# Patient Record
Sex: MALE | Race: WHITE
[De-identification: names, ages, dates, MRNs, and addresses within clinical notes are randomized per-mention and may not be internally consistent; named-entity substitution may affect disease eponyms.]

---

## 2018-06-02 ENCOUNTER — HOSPITAL ENCOUNTER (OUTPATIENT)
Dept: HOSPITAL 97 - ER | Age: 68
Setting detail: OBSERVATION
LOS: 1 days | Discharge: HOME | End: 2018-06-03
Attending: INTERNAL MEDICINE | Admitting: INTERNAL MEDICINE
Payer: COMMERCIAL

## 2018-06-02 VITALS — BODY MASS INDEX: 30.4 KG/M2

## 2018-06-02 DIAGNOSIS — G40.909: ICD-10-CM

## 2018-06-02 DIAGNOSIS — K21.9: ICD-10-CM

## 2018-06-02 DIAGNOSIS — F20.9: ICD-10-CM

## 2018-06-02 DIAGNOSIS — R07.9: Primary | ICD-10-CM

## 2018-06-02 DIAGNOSIS — C18.9: ICD-10-CM

## 2018-06-02 DIAGNOSIS — E78.5: ICD-10-CM

## 2018-06-02 DIAGNOSIS — N31.8: ICD-10-CM

## 2018-06-02 DIAGNOSIS — I10: ICD-10-CM

## 2018-06-02 DIAGNOSIS — K57.90: ICD-10-CM

## 2018-06-02 DIAGNOSIS — M15.9: ICD-10-CM

## 2018-06-02 DIAGNOSIS — J30.9: ICD-10-CM

## 2018-06-02 LAB
ALBUMIN SERPL BCP-MCNC: 3.9 G/DL (ref 3.2–5.5)
ALP SERPL-CCNC: 84 IU/L (ref 42–121)
ALT SERPL W P-5'-P-CCNC: 21 IU/L (ref 10–60)
AST SERPL W P-5'-P-CCNC: 24 IU/L (ref 10–42)
BUN BLD-MCNC: 16 MG/DL (ref 6–20)
CKMB CREATINE KINASE MB: 1.8 NG/ML (ref 0.3–4)
GLUCOSE SERPLBLD-MCNC: 99 MG/DL (ref 65–120)
HCT VFR BLD CALC: 40.3 % (ref 39.6–49)
INR BLD: 1.03
LYMPHOCYTES # SPEC AUTO: 1.3 K/UL (ref 0.7–4.9)
MAGNESIUM SERPL-MCNC: 1.8 MG/DL (ref 1.8–2.5)
MCH RBC QN AUTO: 32.4 PG (ref 27–35)
MCV RBC: 94.5 FL (ref 80–100)
PMV BLD: 7.8 FL (ref 7.6–11.3)
POTASSIUM SERPL-SCNC: 4 MEQ/L (ref 3.6–5)
RBC # BLD: 4.27 M/UL (ref 4.33–5.43)

## 2018-06-02 PROCEDURE — 82550 ASSAY OF CK (CPK): CPT

## 2018-06-02 PROCEDURE — 82553 CREATINE MB FRACTION: CPT

## 2018-06-02 PROCEDURE — 96375 TX/PRO/DX INJ NEW DRUG ADDON: CPT

## 2018-06-02 PROCEDURE — 80184 ASSAY OF PHENOBARBITAL: CPT

## 2018-06-02 PROCEDURE — 96374 THER/PROPH/DIAG INJ IV PUSH: CPT

## 2018-06-02 PROCEDURE — 85025 COMPLETE CBC W/AUTO DIFF WBC: CPT

## 2018-06-02 PROCEDURE — 84484 ASSAY OF TROPONIN QUANT: CPT

## 2018-06-02 PROCEDURE — 83735 ASSAY OF MAGNESIUM: CPT

## 2018-06-02 PROCEDURE — 85610 PROTHROMBIN TIME: CPT

## 2018-06-02 PROCEDURE — 85730 THROMBOPLASTIN TIME PARTIAL: CPT

## 2018-06-02 PROCEDURE — 71275 CT ANGIOGRAPHY CHEST: CPT

## 2018-06-02 PROCEDURE — 81003 URINALYSIS AUTO W/O SCOPE: CPT

## 2018-06-02 PROCEDURE — 93005 ELECTROCARDIOGRAM TRACING: CPT

## 2018-06-02 PROCEDURE — 36415 COLL VENOUS BLD VENIPUNCTURE: CPT

## 2018-06-02 PROCEDURE — 80185 ASSAY OF PHENYTOIN TOTAL: CPT

## 2018-06-02 PROCEDURE — 96372 THER/PROPH/DIAG INJ SC/IM: CPT

## 2018-06-02 PROCEDURE — 71045 X-RAY EXAM CHEST 1 VIEW: CPT

## 2018-06-02 PROCEDURE — 83880 ASSAY OF NATRIURETIC PEPTIDE: CPT

## 2018-06-02 PROCEDURE — 80076 HEPATIC FUNCTION PANEL: CPT

## 2018-06-02 PROCEDURE — 80048 BASIC METABOLIC PNL TOTAL CA: CPT

## 2018-06-02 PROCEDURE — 99285 EMERGENCY DEPT VISIT HI MDM: CPT

## 2018-06-02 RX ADMIN — Medication SCH ML: at 22:25

## 2018-06-02 RX ADMIN — MORPHINE SULFATE PRN MG: 4 INJECTION, SOLUTION INTRAMUSCULAR; INTRAVENOUS at 23:47

## 2018-06-02 NOTE — EDPHYS
Physician Documentation                                                                           

 [facility]                                                                

Name: Tai Reyes                                                                                

Age: 67 yrs                                                                                       

Sex: Male                                                                                         

: 1950                                                                                   

MRN: [id number]                                                                                   

Arrival Date: 2018                                                                          

Time: 16:19                                                                                       

Account#: [id number]                                                                            

Bed 6                                                                                             

Private MD:                                                                                       

ED Physician Adam Alston                                                                       

HPI:                                                                                              

                                                                                             

17:00 This 67 yrs old  Male presents to ER via EMS with complaints of Chest Pain >   pm1 

      29 y/o.                                                                                     

17:00 The patient or guardian reports chest pain that is located primarily in the anterior    pm1 

      aspect of left upper chest. Onset: this morning, at 08:00. The pain does not radiate.       

      Associated signs and symptoms: Pertinent positives: Dizziness with standing, Pertinent      

      negatives: abdominal pain, cough, diaphoresis, headache, nausea, shortness of breath,       

      vomiting. The chest pain is described as aching. Duration: The patient or guardian          

      reports a single episode, that is still ongoing, and worsening. Modifying factors: The      

      symptoms are alleviated by nothing. the symptoms are aggravated by nothing. Severity of     

      pain: in the emergency department the pain has improved is a 6 / 10. EMS care prior to      

      arrival includes: aspirin, nitroglycerin, x 1, with partial relief of the chest pain.       

      The patient has not recently seen a physician, the patient's primary care provider is       

      Dr. Petty.                                                                                   

                                                                                                  

Historical:                                                                                       

- Allergies:                                                                                      

16:29 No Known Drug Allergies;                                                                ph  

- Home Meds:                                                                                      

16:29 amlodipine-benazepril 5-20 mg Oral cap 1 cap once daily [Active]; benztropine 0.5 mg    ph  

      Oral tab 1 tab 2 times per day [Active]; clonazepam 1 mg Oral tab 1 tab nightly             

      [Active]; excitalopram 20mg daily [Active]; Latuda 120 mg Oral tab 1 tab once daily         

      [Active]; lorapine 50mg 2 tab nightly [Active]; metoprolol tartrate 25 mg Oral tab 1        

      tab 2 times per day [Active]; phenobarbital 32.4 mg Oral tab 1 tab 3 times per day          

      [Active]; Phenytoin 100mg Oral 1 tabs 3 times per day [Active];                             

- PMHx:                                                                                           

16:29 colon cancer; Depression; Hypertension; Schizophrenia;                                  ph  

                                                                                                  

- Immunization history:: Adult Immunizations unknown.                                             

- Social history:: Smoking status: Patient/guardian denies using tobacco.                         

- Ebola Screening: : No symptoms or risks identified at this time.                                

                                                                                                  

                                                                                                  

ROS:                                                                                              

17:00 Constitutional: Negative for fever, chills, and weight loss, Eyes: Negative for injury, pm1 

      pain, redness, and discharge, ENT: Negative for injury, pain, and discharge, Neck:          

      Negative for injury, pain, and swelling, Respiratory: Negative for shortness of breath,     

      cough, wheezing, and pleuritic chest pain, Abdomen/GI: Negative for abdominal pain,         

      nausea, vomiting, diarrhea, and constipation.                                               

17:00 Back: Negative for injury and pain, : Negative for injury, bleeding, discharge, and       

      swelling, MS/Extremity: Negative for injury and deformity, Skin: Negative for injury,       

      rash, and discoloration, Neuro: Negative for headache, weakness, numbness, tingling,        

      and seizure.                                                                                

17:00 Cardiovascular: Positive for chest pain, Negative for edema, orthopnea, palpitations.       

                                                                                                  

Exam:                                                                                             

17:00 Constitutional:  This is a well developed, well nourished patient who is awake, alert,  pm1 

      and in no acute distress. Head/Face:  Normocephalic, atraumatic. Eyes:  Pupils equal        

      round and reactive to light, extra-ocular motions intact.  Lids and lashes normal.          

      Conjunctiva and sclera are non-icteric and not injected.  Cornea within normal limits.      

      Periorbital areas with no swelling, redness, or edema. ENT:  Nares patent. No nasal         

      discharge, no septal abnormalities noted.  Tympanic membranes are normal and external       

      auditory canals are clear.  Oropharynx with no redness, swelling, or masses, exudates,      

      or evidence of obstruction, uvula midline.  Mucous membranes moist. Neck:  Trachea          

      midline, no thyromegaly or masses palpated, and no cervical lymphadenopathy.  Supple,       

      full range of motion without nuchal rigidity, or vertebral point tenderness.  No            

      Meningismus. Chest/axilla:  Normal chest wall appearance and motion.  Nontender with no     

      deformity.  No lesions are appreciated. Cardiovascular:  Regular rate and rhythm with a     

      normal S1 and S2.  No gallops, murmurs, or rubs.  No pulse deficits. Respiratory:           

      Lungs have equal breath sounds bilaterally, clear to auscultation and percussion.  No       

      rales, rhonchi or wheezes noted.  No increased work of breathing, no retractions or         

      nasal flaring. Abdomen/GI:  Soft, non-tender, with normal bowel sounds.  No distension      

      or tympany.  No guarding or rebound.  No evidence of tenderness throughout. Back:  No       

      spinal tenderness.  No costovertebral tenderness.  Full range of motion. Skin:  Warm,       

      dry with normal turgor.  Normal color with no rashes, no lesions, and no evidence of        

      cellulitis. MS/ Extremity:  Pulses equal, no cyanosis.  Neurovascular intact.  Full,        

      normal range of motion except for flexed contracture to left wrist                          

17:00 ECG was reviewed by the Attending Physician. NSR                                            

17:00 Neuro: Orientation: is normal, Motor: moves all fours, strength is 5/5 in all               

      extremities.                                                                                

                                                                                                  

Vital Signs:                                                                                      

16:25  / 91; Pulse 78; Resp 18; Temp 98.7; Pulse Ox 100% on R/A; Weight 102.97 kg;      ph  

      Height 6 ft. 0 in. (182.88 cm); Pain 7/10;                                                  

17:15  / 92; Pulse 73; Resp 17; Pulse Ox 95% on R/A;                                    dh3 

17:41  / 88; Pulse 85; Resp 16; Pulse Ox 97% on R/A;                                    dh3 

17:44 Pain 7/10;                                                                              hb  

18:08  / 89; Pulse 78; Resp 16; Pulse Ox 100% on R/A; Pain 7/10;                        hb  

18:36  / 77 RA; Pulse 75; Resp 17; Pulse Ox 98% on R/A;                                 dh3 

18:36  / 84 LA; Pulse 75; Resp 18; Pulse Ox 98% on R/A;                                 dh3 

19:50  / 92; Pulse 69; Resp 12; Pulse Ox 96% on R/A;                                    rv  

19:57  / 92; Pulse 70; Resp 12; Pulse Ox 96% ; Pain 0/10;                               ao  

20:55  / 96; Pulse 72; Resp 18; Pulse Ox 100% on R/A; Pain 0/10;                        ao  

16:25 Body Mass Index 30.79 (102.97 kg, 182.88 cm)                                            ph  

                                                                                                  

MDM:                                                                                              

16:43 Patient medically screened.                                                             pm1 

17:00 Data reviewed: vital signs. Data interpreted: Pulse oximetry: on room air is 100 %.     pm1 

      Interpretation: normal.                                                                     

19:19 Physician consultation: Kam Vences MD was contacted at 19:20, regarding admission,    pm1 

      patient's condition, and will see patient in ED.                                            

                                                                                                  

                                                                                             

16:37 Order name: Basic Metabolic Panel                                                       pm1 

                                                                                             

16:37 Order name: BNP                                                                         pm1 

                                                                                             

16:37 Order name: CBC with Diff                                                               pm1 

                                                                                             

16:37 Order name: Ckmb                                                                        pm1 

                                                                                             

16:37 Order name: CPK                                                                         pm1 

                                                                                             

16:37 Order name: LFT's; Complete Time: 17:40                                                 pm1 

                                                                                             

16:37 Order name: Magnesium; Complete Time: 17:40                                             pm1 

                                                                                             

16:37 Order name: PT-INR; Complete Time: 17:23                                                pm1 

                                                                                             

16:37 Order name: Ptt, Activated; Complete Time: 17:23                                        pm1 

                                                                                             

16:37 Order name: Troponin (emerg Dept Use Only); Complete Time: 17:23                        pm1 

                                                                                             

16:37 Order name: Basic Metabolic Panel; Complete Time: 17:40                                 EDMS

                                                                                             

16:37 Order name: BNP B-Type Natriuretic Peptide; Complete Time: 17:40                        EDMS

                                                                                             

16:37 Order name: CBC with Automated Diff; Complete Time: 17:11                               EDMS

                                                                                             

16:37 Order name: CKMB Creatine Kinase MB; Complete Time: 17:40                               EDMS

02                                                                                             

16:37 Order name: XRAY Chest (1 view); Complete Time: 20:42                                   pm1 

                                                                                             

16:37 Order name: EKG; Complete Time: 16:37                                                   pm1 

                                                                                             

16:37 Order name: Creatine Phosphokinase; Complete Time: 17:40                                EDMS

02                                                                                             

17:55 Order name: Urine Dipstick--Ancillary (enter results); Complete Time: 18:02             em1 

02                                                                                             

18:13 Order name: Dilantin                                                                    pm1 

                                                                                             

18:13 Order name: Phenobarbital                                                               pm1 

                                                                                             

18:13 Order name: Phenytoin (Dilantin) Level; Complete Time: 18:29                            EDMS

                                                                                             

18:13 Order name: Phenobarbital Level; Complete Time: 18:29                                   EDMS

                                                                                             

18:29 Order name: CT Chest For PE Angio; Complete Time: 20:42                                 pm1 

                                                                                             

21:25 Order name: Troponin I                                                                  ao  

                                                                                             

22:12 Order name: Troponin I                                                                  EDMS

                                                                                             

16:37 Order name: Cardiac monitoring; Complete Time: 16:52                                    pm1 

                                                                                             

16:37 Order name: EKG - Nurse/Tech; Complete Time: 16:52                                      pm1 

                                                                                             

16:37 Order name: IV Saline Lock; Complete Time: 16:52                                        pm1 

                                                                                             

16:37 Order name: Labs collected and sent; Complete Time: 16:52                               pm1 

                                                                                             

16:37 Order name: O2 Per Protocol; Complete Time: 16:52                                       pm1 

                                                                                             

16:37 Order name: O2 Sat Monitoring; Complete Time: 16:52                                     pm1 

                                                                                             

16:37 Order name: Urine Dipstick-Ancillary (obtain specimen); Complete Time: 17:48            pm1 

                                                                                                  

Administered Medications:                                                                         

17:44 Drug: Nitroglycerin 0.4 mg Route: Sublingual;                                           hb  

17:53 Drug: Nitroglycerin 0.4 mg Route: Sublingual;                                           hb  

18:22 Follow up: Response: No adverse reaction                                                hb  

18:21 Drug: morphine 4 mg Route: IVP; Site: right antecubital;                                hb  

21:25 Follow up: Response: No adverse reaction                                                ao  

18:22 Drug: Zofran 4 mg Route: IVP; Site: right antecubital;                                  hb  

21:26 Follow up: Response: No adverse reaction                                                ao  

19:39 Drug: Lovenox 1 mg/kg Route: Sub-Q; Site: left upper abdomen;                           rv  

21:26 Follow up: Response: No adverse reaction                                                ao  

19:39 Drug: Metoprolol 25 mg Route: PO;                                                       rv  

21:27 Follow up: Response: No adverse reaction                                                ao  

                                                                                                  

                                                                                                  

Disposition:                                                                                      

                                                                                             

12:40 Co-signature as Attending Physician, Adam Alston MD.                                    

                                                                                                  

Disposition:                                                                                      

18 19:20 Hospitalization ordered by DENZEL Petty for Observation. Preliminary diagnosis is       

  Chest pain, unspecified.                                                                        

- Bed requested for Telemetry/MedSurg (observation).                                              

- Status is Observation.                                                                      ao  

- Condition is Stable.                                                                            

- Problem is new.                                                                                 

- Symptoms have improved.                                                                         

UTI on Admission? No                                                                              

                                                                                                  

                                                                                                  

                                                                                                  

Signatures:                                                                                       

Dispatcher [employer]                           Theresa Goldsmith RN RN kl Hall, Patricia, RN RN ph Ortiz, Alex, RN RN ao Marinas, Patrick, NP                    NP   pm1                                                  

Thuy Michael RN RN hb Starr, Gregory, MD MD                                                      

Obey Villarreal RN                    RN   rv                                                   

                                                                                                  

Corrections: (The following items were deleted from the chart)                                    

                                                                                             

20:35 19:20 Hospitalization Ordered by A Malinda RIGGINS for Observation. Preliminary diagnosis is    kl  

      Chest pain, unspecified. Bed requested for Telemetry/MedSurg (observation). Status is       

      Observation. Condition is Stable. Problem is new. Symptoms have improved. UTI on            

      Admission? No. pm1                                                                          

22:15 20:35 2018 19:20 Hospitalization Ordered by A Malinda RIGGINS for Observation.            ao  

      Preliminary diagnosis is Chest pain, unspecified. Bed requested for Telemetry/MedSurg       

      (observation). Status is Observation. Condition is Stable. Problem is new. Symptoms         

      have improved. UTI on Admission? No. kl                                                     

                                                                                                  

**************************************************************************************************

## 2018-06-02 NOTE — ER
Nurse's Notes                                                                                     

 [facility]                                                                

Name: Tai Reyes                                                                                

Age: 67 yrs                                                                                       

Sex: Male                                                                                         

: 1950                                                                                   

MRN: [id number]                                                                                   

Arrival Date: 2018                                                                          

Time: 16:19                                                                                       

Account#: [id number]                                                                            

Bed 6                                                                                             

Private MD:                                                                                       

Diagnosis: Chest pain, unspecified                                                                

                                                                                                  

Presentation:                                                                                     

                                                                                             

16:20 Presenting complaint: EMS states: Began having L sided chest pain this morning while    ph  

      watching television, describes pain as "deep and shallow", denies radiation, N/V, SOB,      

      reported dizziness upon standing, 12 lead showed NSR, 324 aspirin and SL nitro x 1          

      administered and pain improved from 6/10 down from 8/10. Transition of care: patient        

      was not received from another setting of care. Onset of symptoms was 2018.         

      Risk Assessment: Do you want to hurt yourself or someone else? Patient reports no           

      desire to harm self or others. Initial Sepsis Screen: Does the patient meet any 2           

      criteria? No. Patient's initial sepsis screen is negative. Does the patient have a          

      suspected source of infection? No. Patient's initial sepsis screen is negative. Care        

      prior to arrival: Medication(s) given: ASA, 325 mg, Nitroglycerin, 0.4 mg SL x 1.           

16:20 Method Of Arrival: EMS: [facility]                                                ph  

16:20 Acuity: VALENTIN 3                                                                           ph  

16:25 Note PCP Malinda.                                                                            

                                                                                                  

Historical:                                                                                       

- Allergies:                                                                                      

16:29 No Known Drug Allergies;                                                                ph  

- Home Meds:                                                                                      

16:29 amlodipine-benazepril 5-20 mg Oral cap 1 cap once daily [Active]; benztropine 0.5 mg    ph  

      Oral tab 1 tab 2 times per day [Active]; clonazepam 1 mg Oral tab 1 tab nightly             

      [Active]; excitalopram 20mg daily [Active]; Latuda 120 mg Oral tab 1 tab once daily         

      [Active]; lorapine 50mg 2 tab nightly [Active]; metoprolol tartrate 25 mg Oral tab 1        

      tab 2 times per day [Active]; phenobarbital 32.4 mg Oral tab 1 tab 3 times per day          

      [Active]; Phenytoin 100mg Oral 1 tabs 3 times per day [Active];                             

- PMHx:                                                                                           

16:29 colon cancer; Depression; Hypertension; Schizophrenia;                                  ph  

                                                                                                  

- Immunization history:: Adult Immunizations unknown.                                             

- Social history:: Smoking status: Patient/guardian denies using tobacco.                         

- Ebola Screening: : No symptoms or risks identified at this time.                                

                                                                                                  

                                                                                                  

Screenin:30 Abuse screen: Denies threats or abuse. Denies injuries from another. Nutritional        ph  

      screening: No deficits noted. Tuberculosis screening: No symptoms or risk factors           

      identified. Fall Risk None identified.                                                      

                                                                                                  

Assessment:                                                                                       

16:31 General: Appears in no apparent distress. comfortable, well groomed, Behavior is calm,  ph  

      cooperative, appropriate for age, Denies fever, feeling ill. Pain: Complains of pain in     

      anterior aspect of left upper chest and left breast Pain does not radiate. Pain             

      currently is 7 out of 10 on a pain scale. Pain began " this morning". Neuro: Level of       

      Consciousness is awake, alert, obeys commands, Oriented to person, place, time,             

      situation, Pt noted to have flaccidity to L hand and slurred speech, pt reports that        

      this is his baseline, states, " I was born like this.". Cardiovascular: Reports chest       

      pain, lightheadedness, Denies nausea, palpitations, shortness of breath, vomiting,          

      Capillary refill < 3 seconds Patient's skin is warm and dry. Rhythm is regular Chest        

      pain is located in left anterior chest wall. Respiratory: Airway is patent Respiratory      

      effort is even, unlabored, Respiratory pattern is regular, symmetrical. GI: No signs        

      and/or symptoms were reported involving the gastrointestinal system. Derm: Skin is          

      intact, is healthy with good turgor, Skin is pink, warm \T\ dry. Musculoskeletal:           

      Circulation, motion, and sensation intact. Range of motion: intact in all extremities.      

16:50 Reassessment: Nitro ordered, medication unavailable in [facility], request faxed to            

      pharmacy. DUANE Villatoro notified.                                                              

17:45 Reassessment: Pt reports pain 6-7/10, Nitro administered as ordered. DUANE watson  

      notified.                                                                                   

17:53 Reassessment: Pt reports pain unchanged, nitro repeated. DUANE Villatoro notified.             

18:09 Reassessment: Pt reports pain still 7/10. DUANE Villatoro notified at bedside.                 

18:23 Reassessment: Pt reports pain 8/10, DUANE Villatoro notified at bedside, morphine and zofran hb  

      administered as ordered. VSS. Family remains at bedside.                                    

19:49 General: Appears in no apparent distress. comfortable, Behavior is calm, cooperative,   ao  

      appropriate for age. Pain: Complains of pain in chest Pain currently is 6 out of 10 on      

      a pain scale. Neuro: Level of Consciousness is awake, alert, obeys commands, Oriented       

      to person, place, time, situation, Moves all extremities. Speech is normal, Facial          

      symmetry appears normal. Cardiovascular: Heart tones S1 S2 Capillary refill < 3 seconds     

      Patient's skin is warm and dry. Chest pain is located in left anterior chest wall.          

      Respiratory: Airway is patent Respiratory effort is even, unlabored, Respiratory            

      pattern is regular, symmetrical. GI: Abdomen is non-distended. : No signs and/or          

      symptoms were reported regarding the genitourinary system. EENT: No signs and/or            

      symptoms were reported regarding the EENT system. Derm: Skin is intact, is healthy with     

      good turgor, Skin is pink, warm \T\ dry. Musculoskeletal: Circulation, motion, and          

      sensation intact. Range of motion: intact in all extremities.                               

20:55 Reassessment: Patient appears in no apparent distress at this time. Patient and/or      ao  

      family updated on plan of care and expected duration. Pain level reassessed. Patient is     

      alert, oriented x 3, equal unlabored respirations, skin warm/dry/pink. Patient to be        

      taking to room as he get admitted to the hospital.                                          

22:00 Reassessment: Report called to GAMA Adkins.                                             ao  

                                                                                                  

Vital Signs:                                                                                      

16:25  / 91; Pulse 78; Resp 18; Temp 98.7; Pulse Ox 100% on R/A; Weight 102.97 kg;      ph  

      Height 6 ft. 0 in. (182.88 cm); Pain 7/10;                                                  

17:15  / 92; Pulse 73; Resp 17; Pulse Ox 95% on R/A;                                    dh3 

17:41  / 88; Pulse 85; Resp 16; Pulse Ox 97% on R/A;                                    dh3 

17:44 Pain 7/10;                                                                              hb  

18:08  / 89; Pulse 78; Resp 16; Pulse Ox 100% on R/A; Pain 7/10;                        hb  

18:36  / 77 RA; Pulse 75; Resp 17; Pulse Ox 98% on R/A;                                 dh3 

18:36  / 84 LA; Pulse 75; Resp 18; Pulse Ox 98% on R/A;                                 dh3 

19:50  / 92; Pulse 69; Resp 12; Pulse Ox 96% on R/A;                                    rv  

19:57  / 92; Pulse 70; Resp 12; Pulse Ox 96% ; Pain 0/10;                               ao  

20:55  / 96; Pulse 72; Resp 18; Pulse Ox 100% on R/A; Pain 0/10;                        ao  

16:25 Body Mass Index 30.79 (102.97 kg, 182.88 cm)                                            ph  

                                                                                                  

ED Course:                                                                                        

16:19 Patient arrived in ED.                                                                  ph  

16:23 Inserted saline lock: 20 gauge in right antecubital area, using aseptic technique.      dh3 

      Blood collected.                                                                            

16:25 Triage completed.                                                                       ph  

16:30 Arm band placed on. EKG completed in triage. Results shown to MD.                       ph  

16:31 Patient has correct armband on for positive identification. Placed in gown. Bed in low  ph  

      position. Call light in reach. Side rails up X 1. Cardiac monitor on. Pulse ox on. NIBP     

      on. Warm blanket given.                                                                     

16:32 EKG done, by ED staff, reviewed by Adam Alston MD.                                    dh3 

16:37 Irving Loyd NP is PHCP.                                                           pm1 

16:37 Adam Alston MD is Attending Physician.                                              pm1 

16:54 Initial lab(s) drawn, by me, sent to lab.                                               dh3 

16:56 Patient maintains SpO2 saturation greater than 95% on room air.                         ph  

16:57 X-ray completed. Portable x-ray completed in exam room. Patient tolerated procedure     mh1 

      well.                                                                                       

16:58 XRAY Chest (1 view) In Process Unspecified.                                             EDMS

17:48 Thuy Michael, GAMA is Primary Nurse.                                                   hb  

18:54 CT Chest For PE Angio In Process Unspecified.                                           EDMS

19:20 DENZEL Petty MD is Hospitalizing Provider.                                                  pm1 

22:13 No provider procedures requiring assistance completed. Patient admitted, IV remains in  ao  

      place.                                                                                      

                                                                                                  

Administered Medications:                                                                         

17:44 Drug: Nitroglycerin 0.4 mg Route: Sublingual;                                           hb  

17:53 Drug: Nitroglycerin 0.4 mg Route: Sublingual;                                           hb  

18:22 Follow up: Response: No adverse reaction                                                hb  

18:21 Drug: morphine 4 mg Route: IVP; Site: right antecubital;                                hb  

21:25 Follow up: Response: No adverse reaction                                                ao  

18:22 Drug: Zofran 4 mg Route: IVP; Site: right antecubital;                                  hb  

21:26 Follow up: Response: No adverse reaction                                                ao  

19:39 Drug: Lovenox 1 mg/kg Route: Sub-Q; Site: left upper abdomen;                           rv  

21:26 Follow up: Response: No adverse reaction                                                ao  

19:39 Drug: Metoprolol 25 mg Route: PO;                                                       rv  

21:27 Follow up: Response: No adverse reaction                                                ao  

                                                                                                  

                                                                                                  

Outcome:                                                                                          

19:20 Decision to Hospitalize by Provider.                                                    pm1 

22:14 Admitted to Med/surg accompanied by tech, room 204, with chart, Report called to        norris Adkins RN                                                                                 

22:14 Condition: stable                                                                           

22:14 Instructed on the need for admit.                                                           

22:15 Patient left the ED.                                                                    ao  

                                                                                                  

Signatures:                                                                                       

Dispatcher [employer]                           EDMS                                                 

Cony Reeves                               1                                                  

Kaia De Leon, GAMA MALLOY                                                      

Stuart Tafoya RN RN ao Marinas, Patrick, NP                    NP   pm1                                                  

Thuy Michael RN RN                                                      

Svetlana Osorio                              3                                                  

Obey Villarreal RN                    RN   rv                                                   

                                                                                                  

**************************************************************************************************

## 2018-06-02 NOTE — RAD REPORT
EXAM DESCRIPTION:  RAD - Chest Single View - 6/2/2018 5:00 pm

 

CLINICAL HISTORY:  Left-sided chest pain

 

COMPARISON:  February 2017

 

TECHNIQUE:  AP portable chest image was obtained 1649 hour .

 

FINDINGS:  No peripheral mass, consolidation or failure finding. Right lobectomy surgical changes are
 present and stable. No acute lung parenchymal process seen. Heart and vasculature are normal. No hedy
surable pleural effusion and no pneumothorax. No gross bony abnormality seen. No acute aortic finding
s suspected.

 

IMPRESSION:  No acute cardiopulmonary process.

 

No significant change from comparison.

## 2018-06-02 NOTE — RAD REPORT
EXAM DESCRIPTION:  CT - Chest For Pe Angio - 6/2/2018 6:54 pm

 

CLINICAL HISTORY:   Chest pain, shortness of breath

 

Due to technical issues, final report could not be immediately dictated. Findings were telephoned to 
the referring clinician at the time of the study.

 

COMPARISON:  Chest films same date

 

TECHNIQUE:  Dynamically enhanced 3 mm thick images of the chest were obtained during administration o
f approximately 150mL Isovue 370 IV contrast. Coronal and oblique reconstruction images were generate
d and reviewed. Exam utilizes a protocol to evaluate the pulmonary arterial tree.

 

All CT scans are performed using dose optimization technique as appropriate and may include automated
 exposure control or mA/KV adjustment according to patient size.

 

FINDINGS:  No pulmonary emboli are identified.

 

The aorta as imaged shows no acute or suspicious finding. No pericardial thickening or effusion.

 

No infiltrate or mass in the lung parenchyma. No pleural effusion or pleural thickening.

 

No mediastinal or hilar suspicious masses. No chest wall masses or abnormal axillary lymphadenopathy.


 

IMPRESSION:  No pulmonary emboli identified.

 

No other significant or suspicious findings.

## 2018-06-03 VITALS — TEMPERATURE: 97.8 F | DIASTOLIC BLOOD PRESSURE: 80 MMHG | SYSTOLIC BLOOD PRESSURE: 157 MMHG

## 2018-06-03 VITALS — OXYGEN SATURATION: 94 %

## 2018-06-03 LAB
BUN BLD-MCNC: 15 MG/DL (ref 6–20)
GLUCOSE SERPLBLD-MCNC: 97 MG/DL (ref 65–120)
HCT VFR BLD CALC: 40.7 % (ref 39.6–49)
LYMPHOCYTES # SPEC AUTO: 1.2 K/UL (ref 0.7–4.9)
MCH RBC QN AUTO: 32.2 PG (ref 27–35)
MCV RBC: 96.7 FL (ref 80–100)
PMV BLD: 7.8 FL (ref 7.6–11.3)
POTASSIUM SERPL-SCNC: 4.5 MEQ/L (ref 3.6–5)
RBC # BLD: 4.21 M/UL (ref 4.33–5.43)

## 2018-06-03 RX ADMIN — MORPHINE SULFATE PRN MG: 4 INJECTION, SOLUTION INTRAMUSCULAR; INTRAVENOUS at 06:01

## 2018-06-03 RX ADMIN — Medication SCH ML: at 08:01

## 2018-06-03 NOTE — HP
Date of Admission:  06/03/2018



SHORT-STAY SUMMARY



Chief Complaint:  Chest pain.



History Of Present Illness:  This is a 67-year-old pleasant male patient, who 
lives at [facility], started to have chest pain 
around 2 o'clock yesterday morning.  The patient has had intermittent pain 
throughout the day yesterday and he came into emergency room yesterday evening.
  After he was evaluated, he was admitted to the hospital.  The patient was 
admitted to telemetry unit.  After he was admitted, he had 1 episode of chest 
pain last night.  His pain is lasting for a short period of time.  He is not 
able to tell exactly how long, but he says it just lasts for a short time and 
then goes away.  It is located in the left side of the chest in the precordial 
region.  No radiation of pain.  No aggravating or relieving factor.  No 
associated symptoms.



Allergies:  NO KNOWN ALLERGIES.



Medications:  List reviewed.



Review of Systems:

Cardiovascular:  As mentioned above.  All other systems reviewed and negative.



Social History:  Negative for smoking, alcohol use.



Family History:  Significant for hypertension, diabetes, coronary artery 
disease.



Past Surgical History:  Significant for right-sided hemicolectomy in February 2015 for colon cancer, hydrocele repair, appendectomy, skin cancer removal, 
partial lobectomy of the right lung in 1985 and it was not due to cancer.



Past Medical History:  Significant for seizure disorder, gastroesophageal 
reflux disease, hypertension, hyperlipidemia, colon cancer, impaired fasting 
glucose, diverticulosis, bladder hypertonicity, schizophrenia, allergic rhinitis
, osteoarthritis at multiple sites.  The patient had a cardiac cath done 
September 2015 and it showed normal coronary arteries.  This was done at our 
hospital and results reviewed.



Physical Examination:

Vital Signs:  Height 6 feet, weight 224 pounds.  Temperature 97.6, pulse 66, 
respiratory rate 18, blood pressure 130/77, oxygen saturation 96% on room air. 

General:  Awake, alert, oriented, not in distress. 

HEENT:  Head atraumatic, normocephalic.  Conjunctivae nonerythematous.  Sclerae 
white.  Mouth, no thrush or edema noted.  Ears/Nose, no mass, lesion, discharge 
noted. 

Neck:  Supple. No JVD, lymph nodes, bruit, thyromegaly noted. 

Lungs:  Bilateral good equal air entry. Clear to auscultation. No rhonchi.  No 
rales. 

Heart:  Normal heart sounds, no murmur or gallop. 

Abdomen:  Soft, bowel sounds normal. No guarding, rigidity, tenderness, mass, 
hepatosplenomegaly, distention, or bruit noted. 

Extremities:  No leg edema.  No calf tenderness. 

Skin:  No rash, ulcer, cellulitis. 

Lymphatics:  No lymph node enlargement in neck, supraclavicular, 
infraclavicular region. 

Neuro:  No focal neurological deficit. 

Chest:  Unremarkable. 

External Genitalia:  Deferred. 

Rectal:  Deferred.



Laboratory Data:  Yesterday; white count 5.8, hemoglobin 13.8, platelets 229.  
This morning; white count 6.1, hemoglobin 13.6, platelets 224.  PT/PTT normal.  
Today; sodium 135, potassium 4.5, chloride 98, bicarb 30, BUN 15, creatinine 
0.90, glucose 97.  Troponin less than 0.03 x3.  BNP less than 10.  Sodium 
yesterday 131, potassium 4, chloride 98, bicarb 25, BUN 16, creatinine 0.89, 
glucose 99.  Liver function tests unremarkable.  Urinalysis negative.  Dilantin 
level 12.6.  Phenobarbital level 11.6.  Chest x-ray, no acute intrathoracic 
changes.  CAT scan of the chest per PE protocol done in the emergency room, no 
acute cardiopulmonary changes, no pulmonary embolism.  EKG, normal sinus 
rhythm.  No acute ST-T changes.



Hospital Course:  After the patient was evaluated in the ER, was admitted to 
the hospital.  His MI has been ruled out by getting serial cardiac enzymes.  
Cardiology consultation has been requested.  We will await for Cardiology 
consultation and after that, if okay with cardiologist, we will plan to 
discharge him to go home.  His chest pain could be due to gastroesophageal 
reflux disease and I will go ahead and start him on proton pump inhibitor 
therapy.



Discharge Medications And Instructions:  

1.   Continue all prior home medication.

2.   Start pantoprazole 40 mg p.o. daily, take it in the morning on empty 
stomach, 30 minutes before breakfast.  Do not take any medications with this.

3.   Follow up at my office in 2 weeks.



Final Diagnoses:  

1.   Chest pain.

2.   Gastroesophageal reflux disease.

3.   Hypertension.

4.   Hyperlipidemia.

5.   Colon cancer.

6.   Impaired fasting glucose.

7.   Diverticulosis.

8.   Bladder hypertonicity.

9.   Schizophrenia.

10.   Allergic rhinitis.

11.   Osteoarthritis, multiple sites.

12.   Seizure disorder.





ZAIRE/MODL

DD:  06/03/2018 11:22:13   Voice ID:  [id number]

[facility] HospitalJARED

## 2018-06-03 NOTE — CON
Chief Complaint:  Chest pain.



History Of Present Illness:  Mr. Reyes has been having chest pain for about 30 years.  He has had a 
very complete evaluation of it including a cardiac cath that was done almost 3 years ago, September 2
015.  At that point, we knew he had very atypical chest pain and a nuclear stress test indicated apic
al ischemia, but the cardiac cath was completely normal.  There were not even any early mild atherosc
lerotic changes.  His arteries were just completely normal.  He was a cigarette smoker, but quit when
 he was in his 20s and he has severe chronic medical problems since birth.  He has cerebral palsy and
 seizure disorder.  He has various musculoskeletal problems, depression, anxiety, mental retardation.




Medications:  Outpatient medications are naproxen, benztropine, clonazepam, phenobarbital, phenytoin,
 amlodipine, loxapine, lurasidone, Lexapro, metoprolol, and multivitamin.



Allergies:  HE HAS NO ALLERGIES.



Social History:  Alcohol use, none.  Illegal drug use, none.



Diagnostic Data:  Since he has been in the hospital, cardiac enzymes are normal.  Electrocardiograms 
are normal.  The patient describes his chest pain is mostly coming on when he eats.  He has had a CT 
angio of his chest that is normal.  A chest x-ray that is normal.  He has never had gallbladder surge
ry.



Physical Examination:

General:  He appears to be older than his stated age, chronically ill.  He appears to have mild menta
l retardation. 

Lungs:  Clear. 

Heart:  Normal. 

Abdomen:  Soft. 

Extremities:  Normal.  Distal pulses are normal.



Impression:  I do not want to see and get another stress test or cardiac cath.  This is clearly not u
nstable angina.  I would be in favor of doing an ultrasound of the abdomen to see if he has gallstone
s.  At this point, he already has an echocardiogram ordered, but I strongly recommend we do not do an
other stress test.  It was false positive last time and we do not have anything compelling us to do a
 stress test.  This is clearly not unstable angina.





KI/MERE

DD:  06/03/2018 11:58:03Voice ID:  [id number]

DT:  06/03/2018 15:38:30Report ID:  [id number]

## 2018-06-03 NOTE — EKG
Test Date:    2018-06-02               Test Time:    16:27:46

Technician:   TATIANA                                     

                                                     

MEASUREMENT RESULTS:                                       

Intervals:                                           

Rate:         73                                     

MD:           202                                    

QRSD:         94                                     

QT:           370                                    

QTc:          407                                    

Axis:                                                

P:            51                                     

MD:           202                                    

QRS:          30                                     

T:            60                                     

                                                     

INTERPRETIVE STATEMENTS:                                       

                                                     

Normal sinus rhythm

Normal ECG

Compared to ECG 02/04/2017 21:36:31

No significant changes



Electronically Signed On 06-03-18 10:41:12 CDT by Dajuan Blandon

## 2018-07-12 ENCOUNTER — HOSPITAL ENCOUNTER (EMERGENCY)
Dept: HOSPITAL 97 - ER | Age: 68
Discharge: HOME | End: 2018-07-12
Payer: COMMERCIAL

## 2018-07-12 VITALS — SYSTOLIC BLOOD PRESSURE: 142 MMHG | DIASTOLIC BLOOD PRESSURE: 84 MMHG

## 2018-07-12 VITALS — OXYGEN SATURATION: 97 %

## 2018-07-12 VITALS — TEMPERATURE: 97.7 F

## 2018-07-12 DIAGNOSIS — S76.011A: Primary | ICD-10-CM
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
## 2018-07-12 DIAGNOSIS — I10: ICD-10-CM
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
## 2018-07-12 DIAGNOSIS — Z85.038: ICD-10-CM

## 2018-07-12 LAB
ALBUMIN SERPL BCP-MCNC: 3.2 G/DL (ref 3.4–5)
ALP SERPL-CCNC: 93 U/L (ref 45–117)
ALT SERPL W P-5'-P-CCNC: 23 U/L (ref 12–78)
AST SERPL W P-5'-P-CCNC: 19 U/L (ref 15–37)
BUN BLD-MCNC: 17 MG/DL (ref 7–18)
CKMB CREATINE KINASE MB: 1.4 NG/ML (ref 0.3–3.6)
GLUCOSE SERPLBLD-MCNC: 101 MG/DL (ref 74–106)
HCT VFR BLD CALC: 37.8 % (ref 39.6–49)
LYMPHOCYTES # SPEC AUTO: 0.7 K/UL (ref 0.7–4.9)
MCH RBC QN AUTO: 32.8 PG (ref 27–35)
MCV RBC: 96.7 FL (ref 80–100)
PMV BLD: 7.7 FL (ref 7.6–11.3)
POTASSIUM SERPL-SCNC: 3.8 MMOL/L (ref 3.5–5.1)
RBC # BLD: 3.91 M/UL (ref 4.33–5.43)

## 2018-07-12 PROCEDURE — 84484 ASSAY OF TROPONIN QUANT: CPT

## 2018-07-12 PROCEDURE — 80053 COMPREHEN METABOLIC PANEL: CPT

## 2018-07-12 PROCEDURE — 82550 ASSAY OF CK (CPK): CPT

## 2018-07-12 PROCEDURE — 72170 X-RAY EXAM OF PELVIS: CPT

## 2018-07-12 PROCEDURE — 82553 CREATINE MB FRACTION: CPT

## 2018-07-12 PROCEDURE — 99284 EMERGENCY DEPT VISIT MOD MDM: CPT

## 2018-07-12 PROCEDURE — 36415 COLL VENOUS BLD VENIPUNCTURE: CPT

## 2018-07-12 PROCEDURE — 85025 COMPLETE CBC W/AUTO DIFF WBC: CPT

## 2018-07-12 NOTE — ER
Nurse's Notes                                                                                     

 [facility]                                                                

Name: Tai Reyes                                                                                

Age: 67 yrs                                                                                       

Sex: Male                                                                                         

: 1950                                                                                   

MRN: [id number]                                                                                   

Arrival Date: 2018                                                                          

Time: 09:06                                                                                       

Account#: [id number]                                                                            

Bed 15                                                                                            

Private MD:                                                                                       

Diagnosis: Strain of muscle, fascia and tendon of right hip                                       

                                                                                                  

Presentation:                                                                                     

                                                                                             

09:00 Presenting complaint: EMS states: pt is from independent living [facility], was  tw2 

      getting dressed this morning and fell to the ground from a standing position, c/o RIGHT     

      knee and hip pain, denies LOC , denies hitting head, pt has chronic paralysis to the        

      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 Transition of care: patient was received from another      

      setting of care (long-term care facility), [facility]. Onset of symptoms was         

      2018. Risk Assessment: Do you want to hurt yourself or someone else? Patient       

      reports no desire to harm self or others. Initial Sepsis Screen: Does the patient meet      

      any 2 criteria? No. Patient's initial sepsis screen is negative. Does the patient have      

      a suspected source of infection? No. Patient's initial sepsis screen is negative. Care      

      prior to arrival: None.                                                                     

09:00 Method Of Arrival: EMS: [facility]                                                tw2 

09:00 Acuity: VALENTIN 3                                                                           tw2 

                                                                                                  

Historical:                                                                                       

- Allergies:                                                                                      

09:12 No Known Drug Allergies;                                                                tw2 

- Home Meds:                                                                                      

09:12 amlodipine-benazepril 5-20 mg Oral cap 1 cap once daily [Active]; benztropine 0.5 mg    tw2 

      Oral tab 1 tab 2 times per day [Active]; Latuda 120 mg Oral tab 1 tab once daily            

      [Active]; clonazepam 1 mg Oral tab 1 tab nightly [Active]; excitalopram 20mg daily          

      [Active]; lorapine 50mg 2 tab nightly [Active]; metoprolol tartrate 25 mg Oral tab 1        

      tab 2 times per day [Active]; phenobarbital 32.4 mg Oral tab 1 tab 3 times per day          

      [Active]; Phenytoin 100mg Oral 1 tabs 3 times per day [Active];                             

- PMHx:                                                                                           

09:12 colon cancer; Depression; Hypertension; Schizophrenia;                                  tw2 

                                                                                                  

- Immunization history:: Adult Immunizations up to date.                                          

- Social history:: Smoking status: Patient/guardian denies using tobacco.                         

- Ebola Screening: : Patient denies travel to an Ebola-affected area in the 21 days               

  before illness onset.                                                                           

- Family history:: not pertinent.                                                                 

- Hospitalizations: : No recent hospitalization is reported.                                      

                                                                                                  

                                                                                                  

Screenin:13 Abuse screen: Denies threats or abuse. Nutritional screening: No deficits noted.        tw2 

      Tuberculosis screening: No symptoms or risk factors identified. Fall Risk None              

      identified.                                                                                 

                                                                                                  

Assessment:                                                                                       

09:14 General: Appears in no apparent distress. Behavior is calm, cooperative, appropriate    tw2 

      for age. Pain: Complains of pain in right hip and right leg. Neuro: Level of                

      Consciousness is awake, alert, obeys commands, Oriented to person, place, time,             

      situation. Cardiovascular: Denies chest pain, shortness of breath, Heart tones S1 S2        

      Capillary refill < 3 seconds Patient's skin is warm and dry. Respiratory: Airway is         

      patent Respiratory effort is even, unlabored, Respiratory pattern is regular,               

      symmetrical, Breath sounds are clear bilaterally. GI: Abdomen is round non-distended,       

      Bowel sounds present X 4 quads. : Reports urinary frequency. EENT: No signs and/or        

      symptoms were reported regarding the EENT system. Derm: No signs and/or symptoms            

      reported regarding the dermatologic system. Skin is intact, is healthy with good            

      turgor, Skin temperature is warm. Musculoskeletal: Circulation, motion, and sensation       

      intact.                                                                                     

10:15 Reassessment: Patient appears in no apparent distress at this time. No changes from     tw2 

      previously documented assessment. Patient and/or family updated on plan of care and         

      expected duration. Pain level reassessed. Patient is alert, oriented x 3, equal             

      unlabored respirations, skin warm/dry/pink.                                                 

11:15 Reassessment: Patient appears in no apparent distress at this time. No changes from     tw2 

      previously documented assessment. Patient and/or family updated on plan of care and         

      expected duration. Pain level reassessed. Patient is alert, oriented x 3, equal             

      unlabored respirations, skin warm/dry/pink.                                                 

11:38 Reassessment: Patient appears in no apparent distress at this time. No changes from     tw2 

      previously documented assessment. Patient and/or family updated on plan of care and         

      expected duration. Pain level reassessed. Patient is alert, oriented x 3, equal             

      unlabored respirations, skin warm/dry/pink.                                                 

12:24 Reassessment: Patient appears in no apparent distress at this time. No changes from     tw2 

      previously documented assessment. Patient and/or family updated on plan of care and         

      expected duration. Pain level reassessed. Patient is alert, oriented x 3, equal             

      unlabored respirations, skin warm/dry/pink.                                                 

                                                                                                  

Vital Signs:                                                                                      

09:09  / 84; Pulse 88; Resp 17; Temp 97.7(O); Pulse Ox 95% on R/A; Weight 101.6 kg (R); tw2 

      Height 6 ft. 0 in. (182.88 cm); Pain 8/10;                                                  

09:34  / 80; Pulse 80; Resp 17; Pulse Ox 95% on R/A;                                    tw2 

10:52  / 56; Pulse 67; Resp 14; Pulse Ox 98% on R/A;                                    tw2 

11:37  / 84; Pulse 71; Resp 11; Pulse Ox 98% on R/A;                                    tw2 

12:24  / 84; Pulse 67; Resp 12; Pulse Ox 97% on R/A;                                    tw2 

09:09 Body Mass Index 30.38 (101.60 kg, 182.88 cm)                                            tw2 

                                                                                                  

ED Course:                                                                                        

09:05 Placed in gown. Bed in low position. Side rails up X2. Cardiac monitor on. Pulse ox on. tw2 

      NIBP on. Warm blanket given.                                                                

09:06 Patient arrived in ED.                                                                  sg  

09:07 Deepti Ferreira, RN is Primary Nurse.                                                        tw 

09:09 Triage completed.                                                                       tw2 

09:09 Arm band placed on.                                                                     tw2 

09:12 [facility]                                                           kav 

09:12 Reece Cross MD is Attending Physician.                                              kav 

09:43 Troponin I Sent.                                                                        tw 

09:43 Ckmb Sent.                                                                              tw2 

09:44 CK Sent.                                                                                tw2 

09:44 CMP Sent.                                                                               tw 

09:44 CBC with Diff Sent.                                                                     tw2 

09:47 Initial lab(s) drawn, by me, sent to lab. Inserted saline lock: 20 gauge in right       mh5 

      antecubital area, using aseptic technique. Blood collected.                                 

10:37 Femur Right XRAY In Process Unspecified.                                                EDMS

10:37 Pelvis XRAY In Process Unspecified.                                                     EDMS

12:24 No provider procedures requiring assistance completed. IV discontinued, intact,         tw2 

      bleeding controlled, No redness/swelling at site. Pressure dressing applied.                

                                                                                                  

Administered Medications:                                                                         

No medications were administered                                                                  

                                                                                                  

                                                                                                  

Outcome:                                                                                          

11:57 Discharge ordered by MD. buenrostro 

12:24 Discharged to                                                                           tw2 

12:24 Discharged to home via wheelchair, with family.                                             

12:24 Condition: stable                                                                           

12:24 Discharge instructions given to patient, family, Instructed on discharge instructions,      

      follow up and referral plans. Demonstrated understanding of instructions, follow-up         

      care.                                                                                       

12:25 Patient left the ED.                                                                    tw2 

                                                                                                  

Signatures:                                                                                       

Dispatcher [employer]                           EDOscar Avalos RN                         GAMA                                                      

Lacey Granado, LESLIEP                    FNP  Deepti Darling RN RN   2                                                  

Clarissa Galo                              [facility]                                                  

                                                                                                  

Corrections: (The following items were deleted from the chart)                                    

11:22 10:52 Pulse 67bpm; Resp 14bpm; Pulse Ox 98% RA; tw2                                     tw2 

                                                                                                  

**************************************************************************************************

## 2018-07-12 NOTE — RAD REPORT
EXAM DESCRIPTION:  RAD - Femur Right - 7/12/2018 10:38 am

 

CLINICAL HISTORY:  PAIN

Trauma, fall

 

COMPARISON:  None

 

FINDINGS:  AP pelvis and right femur, multiple projections are submitted.

 

No fracture or dislocation is seen. Atherosclerosis is noted. No joint effusion.

## 2018-07-12 NOTE — RAD REPORT
EXAM DESCRIPTION:  RAD - Pelvis - 7/12/2018 10:38 am

 

CLINICAL HISTORY:  PAIN

Trauma, fall

 

COMPARISON:  None

 

FINDINGS:  AP pelvis and right femur, multiple projections are submitted.

 

No fracture or dislocation is seen. Atherosclerosis is noted. No joint effusion.

## 2018-07-12 NOTE — EDPHYS
Physician Documentation                                                                           

 [facility]                                                                

Name: Tai Reyes                                                                                

Age: 67 yrs                                                                                       

Sex: Male                                                                                         

: 1950                                                                                   

MRN: [id number]                                                                                   

Arrival Date: 2018                                                                          

Time: 09:06                                                                                       

Account#: [id number]                                                                            

Bed 15                                                                                            

Private MD:                                                                                       

ED Physician Reece Cross                                                                       

HPI:                                                                                              

                                                                                             

09:12 This 67 yrs old  Male presents to ER via EMS with complaints of Fall Injury.   kav 

09:25 Details of fall: The patient fell from an upright position, while walking. Onset: The   kav 

      symptoms/episode began/occurred acutely, 1 day(s) ago. Associated injuries: The patient     

      sustained pelvis and right quadriceps and right hip, painful injury, right leg, painful     

      injury. Severity of symptoms: At their worst the symptoms were mild, just prior to          

      arrival. The patient has not experienced similar symptoms in the past. The patient has      

      not recently seen a physician. patient reports falling from a standing position while       

      changing the garbage bag from the kitchen trash. c/o right hip and right femur pain.        

09:34 PCP is Dr. Montgomery.                                                                        kav 

                                                                                                  

Historical:                                                                                       

- Allergies:                                                                                      

09:12 No Known Drug Allergies;                                                                tw2 

- Home Meds:                                                                                      

09:12 amlodipine-benazepril 5-20 mg Oral cap 1 cap once daily [Active]; benztropine 0.5 mg    tw2 

      Oral tab 1 tab 2 times per day [Active]; Latuda 120 mg Oral tab 1 tab once daily            

      [Active]; clonazepam 1 mg Oral tab 1 tab nightly [Active]; excitalopram 20mg daily          

      [Active]; lorapine 50mg 2 tab nightly [Active]; metoprolol tartrate 25 mg Oral tab 1        

      tab 2 times per day [Active]; phenobarbital 32.4 mg Oral tab 1 tab 3 times per day          

      [Active]; Phenytoin 100mg Oral 1 tabs 3 times per day [Active];                             

- PMHx:                                                                                           

09:12 colon cancer; Depression; Hypertension; Schizophrenia;                                  tw2 

                                                                                                  

- Immunization history:: Adult Immunizations up to date.                                          

- Social history:: Smoking status: Patient/guardian denies using tobacco.                         

- Ebola Screening: : Patient denies travel to an Ebola-affected area in the 21 days               

  before illness onset.                                                                           

- Family history:: not pertinent.                                                                 

- Hospitalizations: : No recent hospitalization is reported.                                      

                                                                                                  

                                                                                                  

ROS:                                                                                              

09:29 Constitutional: Negative for fever, chills, and weight loss, Eyes: Negative for injury, kav 

      pain, redness, and discharge, ENT: Negative for injury, pain, and discharge, Neck:          

      Negative for injury, pain, and swelling, Cardiovascular: Negative for chest pain,           

      palpitations, and edema, Respiratory: Negative for shortness of breath, cough,              

      wheezing, and pleuritic chest pain, Abdomen/GI: Negative for abdominal pain, nausea,        

      vomiting, diarrhea, and constipation, Back: Negative for injury and pain, : Negative      

      for injury, bleeding, discharge, and swelling, Skin: Negative for injury, rash, and         

      discoloration, Neuro: Negative for headache, weakness, numbness, tingling, and seizure,     

      Psych: Negative for depression, anxiety, suicide ideation, homicidal ideation, and          

      hallucinations, Allergy/Immunology: Negative for hives, rash, and allergies, Endocrine:     

      Negative for neck swelling, polydipsia, polyuria, polyphagia, and marked weight             

      changes, Hematologic/Lymphatic: Negative for swollen nodes, abnormal bleeding, and          

      unusual bruising.                                                                           

09:29 MS/extremity: Positive for pain, of the right leg and right hip.                            

                                                                                                  

Exam:                                                                                             

09:29 Constitutional:  This is a well developed, well nourished patient who is awake, alert,  kav 

      and in no acute distress. Head/Face:  Normocephalic, atraumatic. Eyes:  Pupils equal        

      round and reactive to light, extra-ocular motions intact.  Lids and lashes normal.          

      Conjunctiva and sclera are non-icteric and not injected.  Cornea within normal limits.      

      Periorbital areas with no swelling, redness, or edema. ENT:  Nares patent. No nasal         

      discharge, no septal abnormalities noted.  Tympanic membranes are normal and external       

      auditory canals are clear.  Oropharynx with no redness, swelling, or masses, exudates,      

      or evidence of obstruction, uvula midline.  Mucous membranes moist. Neck:  Trachea          

      midline, no thyromegaly or masses palpated, and no cervical lymphadenopathy.  Supple,       

      full range of motion without nuchal rigidity, or vertebral point tenderness.  No            

      Meningismus. Chest/axilla:  Normal chest wall appearance and motion.  Nontender with no     

      deformity.  No lesions are appreciated. Cardiovascular:  Regular rate and rhythm with a     

      normal S1 and S2.  No gallops, murmurs, or rubs.  Normal PMI, no JVD.  No pulse             

      deficits. Respiratory:  Lungs have equal breath sounds bilaterally, clear to                

      auscultation and percussion.  No rales, rhonchi or wheezes noted.  No increased work of     

      breathing, no retractions or nasal flaring. Abdomen/GI:  Soft, non-tender, with normal      

      bowel sounds.  No distension or tympany.  No guarding or rebound.  No evidence of           

      tenderness throughout. Back:  No spinal tenderness.  No costovertebral tenderness.          

      Full range of motion. Skin:  Warm, dry with normal turgor.  Normal color with no            

      rashes, no lesions, and no evidence of cellulitis. Neuro:  Awake and alert, GCS 15,         

      oriented to person, place, time, and situation.  Cranial nerves II-XII grossly intact.      

      Motor strength 5/5 in all extremities.  Sensory grossly intact.  Cerebellar exam            

      normal.  Normal gait. Psych:  Awake, alert, with orientation to person, place and time.     

       Behavior, mood, and affect are within normal limits.                                       

09:29 Musculoskeletal/extremity: Extremities: noted in the right leg and right hip: pain,         

      ROM: full active range of motion, in the right leg and right hip, Circulation is intact     

      in all extremities. Pulses: noted to be 2+ in the right femoral artery and right            

      popliteal artery, Sensation intact. Joints: the  right hip displays pain at rest,           

      tenderness, Weight bearing: can bear weight with assistance only, uses walker.              

                                                                                                  

Vital Signs:                                                                                      

09:09  / 84; Pulse 88; Resp 17; Temp 97.7(O); Pulse Ox 95% on R/A; Weight 101.6 kg (R); tw2 

      Height 6 ft. 0 in. (182.88 cm); Pain 8/10;                                                  

09:34  / 80; Pulse 80; Resp 17; Pulse Ox 95% on R/A;                                    tw2 

10:52  / 56; Pulse 67; Resp 14; Pulse Ox 98% on R/A;                                    tw2 

11:37  / 84; Pulse 71; Resp 11; Pulse Ox 98% on R/A;                                    tw2 

12:24  / 84; Pulse 67; Resp 12; Pulse Ox 97% on R/A;                                    tw2 

09:09 Body Mass Index 30.38 (101.60 kg, 182.88 cm)                                            tw2 

                                                                                                  

MDM:                                                                                              

09:25 Medical screening is not applicable.                                                    kav 

10:34 Data reviewed: vital signs, nurses notes, lab test result(s). ED course: awaiting       [facility] 

      radiology .                                                                                 

                                                                                                  

                                                                                             

09:28 Order name: CBC with Diff; Complete Time: 10:33                                         [facility] 

                                                                                             

09:28 Order name: CMP; Complete Time: 10:33                                                   [facility] 

                                                                                             

09:25 Order name: Femur Right XRAY                                                            kav 

                                                                                             

09:28 Order name: CK; Complete Time: 10:33                                                    [facility] 

                                                                                             

09:29 Order name: Ckmb; Complete Time: 10: 

                                                                                             

09:29 Order name: Troponin I; Complete Time: 10:                                            [facility] 

                                                                                             

09:25 Order name: Pelvis XRAY; Complete Time: 11:55                                           kav 

                                                                                                  

Administered Medications:                                                                         

No medications were administered                                                                  

                                                                                                  

                                                                                                  

Disposition:                                                                                      

17:17 Co-signature as Attending Physician, Reece Cross MD I agree with the assessment and   kdr 

      plan of care.                                                                               

                                                                                                  

Disposition:                                                                                      

18 11:57 Discharged to Home. Impression: Strain of muscle, fascia and tendon of right       

  hip.                                                                                            

- Condition is Stable.                                                                            

- Discharge Instructions: Muscle Strain, Easy-to-Read.                                            

                                                                                                  

- Medication Reconciliation Form, Thank You Letter, Antibiotic Education, Prescription            

  Opioid Use form.                                                                                

- Follow up: Private Physician; When: 2 - 3 days; Reason: Recheck today's complaints,             

  Continuance of care, Re-evaluation by your physician.                                           

- Problem is new.                                                                                 

- Symptoms have improved.                                                                         

- Notes: f/u with pcp/dr. montgomery and discuss option for home health and rehabilitation              

                                                                                                  

                                                                                                  

Signatures:                                                                                       

Dispatcher [employer]                           EDMS                                                 

Reece Cross MD MD kdr Vern, Katherine, FNP                    FNP  Deepti Darling, RN                          RN   tw2                                                  

                                                                                                  

Corrections: (The following items were deleted from the chart)                                    

12:25 11:57 2018 11:57 Discharged to Home. Impression: Strain of muscle, fascia and     tw2 

      tendon of right hip. Condition is Stable. Discharge Instructions: Muscle Strain,            

      Easy-to-Read. Forms are Medication Reconciliation Form, Thank You Letter, Antibiotic        

      Education, Prescription Opioid Use. Follow up: Private Physician; When: 2 - 3 days;         

      Reason: Recheck today's complaints, Continuance of care, Re-evaluation by your              

      physician. Problem is new. Symptoms have improved. kav                                      

                                                                                                  

**************************************************************************************************

## 2018-11-16 ENCOUNTER — HOSPITAL ENCOUNTER (EMERGENCY)
Dept: HOSPITAL 97 - ER | Age: 68
LOS: 1 days | Discharge: PSYCHIATRIC OR SUBSTANCE USE FACILITY | End: 2018-11-17
Payer: COMMERCIAL

## 2018-11-16 DIAGNOSIS — Z79.899: ICD-10-CM

## 2018-11-16 DIAGNOSIS — F20.9: Primary | ICD-10-CM

## 2018-11-16 DIAGNOSIS — I10: ICD-10-CM

## 2018-11-16 DIAGNOSIS — F33.9: ICD-10-CM

## 2018-11-16 LAB
ALBUMIN SERPL BCP-MCNC: 3.4 G/DL (ref 3.4–5)
ALP SERPL-CCNC: 87 U/L (ref 45–117)
ALT SERPL W P-5'-P-CCNC: 36 U/L (ref 12–78)
AST SERPL W P-5'-P-CCNC: 28 U/L (ref 15–37)
BUN BLD-MCNC: 8 MG/DL (ref 7–18)
GLUCOSE SERPLBLD-MCNC: 109 MG/DL (ref 74–106)
HCT VFR BLD CALC: 39.6 % (ref 39.6–49)
INR BLD: 1.07
LYMPHOCYTES # SPEC AUTO: 0.7 K/UL (ref 0.7–4.9)
MCH RBC QN AUTO: 32.3 PG (ref 27–35)
MCV RBC: 94.9 FL (ref 80–100)
METHAMPHET UR QL SCN: NEGATIVE
PMV BLD: 7.2 FL (ref 7.6–11.3)
POTASSIUM SERPL-SCNC: 3.6 MMOL/L (ref 3.5–5.1)
RBC # BLD: 4.17 M/UL (ref 4.33–5.43)
THC SERPL-MCNC: NEGATIVE NG/ML

## 2018-11-16 PROCEDURE — 80185 ASSAY OF PHENYTOIN TOTAL: CPT

## 2018-11-16 PROCEDURE — 36415 COLL VENOUS BLD VENIPUNCTURE: CPT

## 2018-11-16 PROCEDURE — 81003 URINALYSIS AUTO W/O SCOPE: CPT

## 2018-11-16 PROCEDURE — 80307 DRUG TEST PRSMV CHEM ANLYZR: CPT

## 2018-11-16 PROCEDURE — 93005 ELECTROCARDIOGRAM TRACING: CPT

## 2018-11-16 PROCEDURE — 80048 BASIC METABOLIC PNL TOTAL CA: CPT

## 2018-11-16 PROCEDURE — 80320 DRUG SCREEN QUANTALCOHOLS: CPT

## 2018-11-16 PROCEDURE — 85025 COMPLETE CBC W/AUTO DIFF WBC: CPT

## 2018-11-16 PROCEDURE — 99285 EMERGENCY DEPT VISIT HI MDM: CPT

## 2018-11-16 PROCEDURE — 80184 ASSAY OF PHENOBARBITAL: CPT

## 2018-11-16 PROCEDURE — 85610 PROTHROMBIN TIME: CPT

## 2018-11-16 PROCEDURE — 80076 HEPATIC FUNCTION PANEL: CPT

## 2018-11-16 PROCEDURE — 85730 THROMBOPLASTIN TIME PARTIAL: CPT

## 2018-11-16 PROCEDURE — 80329 ANALGESICS NON-OPIOID 1 OR 2: CPT

## 2018-11-16 NOTE — XMS REPORT
Continuity of Care Document

 Created on:2019



Patient:MARISOL TILLMAN

Sex:Male

:1950

External Reference #:[id number]





Demographics







 Address  [address] [place] [address] [address]



   [place] [address]

 

 Phone  [phone]

 

 Preferred Language  Unknown

 

 Marital Status  Unknown

 

 Gnosticism Affiliation  Unknown

 

 Race  Unknown

 

 Ethnic Group  Unknown









Author







 Organization  [employer]









Problems







 Problem  Status  Onset Date  Classification  Date  Comments  Source



         Reported    



             



             







Medications







 Medication  Details  Route  Status  Patient  Ordering  Order  Source



         Instructions  Provider  Date  



               



               



               







Allergies, Adverse Reactions, Alerts







 Substance  Category  Reaction  Severity  Reaction  Status  Date  Comments  
Source



         type    Reported    



                 



                 







Immunizations







 Immunization  Date Given  Site  Status  Last Updated  Comments  Source



             



             







Results







 Order  Results  Value  Reference  Date  Interpretation  Comments  Source



 Name      Range        



               



               







Vital Signs







 Vital Sign  Value  Date  Comments  Source



         







Encounters







 Location  Location  Encounter  Encounter  Reason  Attending  ADM  DC  Status  
Source



   Details  Type  Number  For  Provider  Date  Date    



         Visit          



                   



                   



                   

 

     Outpatient  [id number]    MYNOR  08/15    [facility]        [place]



                   



                   



                   



                   

 

     Outpatient  [id number]    MYNOR      [facility]        [place]



                   



                   



                   



                   







Procedures







 Procedure  Code  Date  Perfomer  Comments  Source

## 2018-11-17 VITALS — TEMPERATURE: 97.8 F

## 2018-11-17 VITALS — OXYGEN SATURATION: 98 % | SYSTOLIC BLOOD PRESSURE: 149 MMHG | DIASTOLIC BLOOD PRESSURE: 80 MMHG

## 2018-11-17 NOTE — EDPHYS
Physician Documentation                                                                           

 [facility]                                                                

Name: Tai Reyes                                                                                

Age: 67 yrs                                                                                       

Sex: Male                                                                                         

: 1950                                                                                   

MRN: [id number]                                                                                   

Arrival Date: 2018                                                                          

Time: 19:47                                                                                       

Account#: [id number]                                                                            

Bed 17                                                                                            

Private MD:                                                                                       

ED Physician Steve Dallas                                                                      

HPI:                                                                                              

                                                                                             

21:57 This 67 yrs old  Male presents to ER via EMS with complaints of hallucinations.jmm 

21:57 The patient presents to the emergency department with psychosis, has experienced        jmm 

      auditory hallucinations, voices are telling patient to commit sucide. Onset: The            

      symptoms/episode began/occurred today. Past psychiatric history: Psychiatric                

      medications include: latuda, loxapine. Associated signs and symptoms: Pertinent             

      negatives: homicidal ideation, shortness of breath. Patient states he is hearing the        

      devil whom is telling him to kill himself. .                                                

                                                                                                  

Historical:                                                                                       

- Allergies:                                                                                      

20:06 No Known Allergies;                                                                     ao  

- Home Meds:                                                                                      

20:06 Latuda 120 mg Oral tab 1 tab once daily [Active]; phenobarbital 100 mg oral tab 1 tab   ao  

      every 8 hours [Active]; excitalopram 20mg daily [Active]; metoprolol tartrate 25 mg         

      Oral tab 1 tab 2 times per day [Active]; amlodipine-benazepril 5-20 mg Oral cap 1 cap       

      once daily [Active]; benztropine 0.5 mg Oral tab 1 tab 2 times per day [Active];            

      Phenytoin 100mg Oral 1 tabs 3 times per day [Active]; lorapine 50mg 2 tab nightly           

      [Active]; clonazepam 1 mg Oral tab 1 tab nightly [Active];                                  

- PMHx:                                                                                           

20:06 colon cancer; Depression; Hypertension; Schizophrenia;                                  ao  

                                                                                             

00:24 Seizures;                                                                               ak1 

- PSHx:                                                                                           

                                                                                             

20:06 None;                                                                                   ao  

                                                                                                  

- Immunization history:: Adult Immunizations up to date.                                          

- Social history:: Smoking status: Patient/guardian denies using tobacco,                         

  Patient/guardian denies using alcohol, street drugs.                                            

- Ebola Screening: : Patient negative for fever greater than or equal to 101.5 degrees            

  Fahrenheit, and additional compatible Ebola Virus Disease symptoms Patient denies               

  exposure to infectious person Patient denies travel to an Ebola-affected area in the            

  21 days before illness onset.                                                                   

                                                                                                  

                                                                                                  

ROS:                                                                                              

21:57 Constitutional: Negative for fever, chills, and weight loss, Eyes: Negative for injury, jmm 

      pain, redness, and discharge, Neck: Negative for injury, pain, and swelling,                

      Cardiovascular: Negative for chest pain, palpitations, and edema, Respiratory: Negative     

      for shortness of breath, cough, wheezing, and pleuritic chest pain, Abdomen/GI:             

      Negative for abdominal pain, nausea, vomiting, diarrhea, and constipation, Back:            

      Negative for injury and pain, : Negative for injury, bleeding, discharge, and             

      swelling, MS/Extremity: Negative for injury and deformity, Skin: Negative for injury,       

      rash, and discoloration, Neuro: Negative for headache, weakness, numbness, tingling,        

      and seizure.                                                                                

21:57 Psych: Positive for auditory hallucinations.                                                

21:57 All other systems are negative.                                                             

                                                                                                  

Exam:                                                                                             

21:57 Constitutional:  This is a well developed, well nourished patient who is awake, alert,  jmm 

      and in no acute distress. Head/Face:  atraumatic. Eyes:  EOMI, no conjunctival erythema     

      appreciated ENT:  Moist Mucus Membranes Neck:  Trachea midline, Supple Chest/axilla:        

      Normal chest wall appearance and motion.   Cardiovascular:  Regular rate and rhythm.        

      No edema appreciated Respiratory:  Normal respirations, no respiratory distress             

      appreciated Abdomen/GI:  Non distended, soft Back:  Normal ROM Skin:  General               

      appearance color normal MS/ Extremity:  Moves all extremities, no obvious deformities       

      appreciated, no edema noted to the lower extremities  Neuro:  Awake and alert, normal       

      gait                                                                                        

21:57 Psych: Behavior/mood is pleasant, cooperative, Delusions/hallucinations are present and     

      described as Auditory.  Devil is telling him to kill himself.  .                            

                                                                                                  

Vital Signs:                                                                                      

20:01  / 86; Pulse 90; Resp 16; Temp 99.1(O); Pulse Ox 95% on R/A; Weight 101.6 kg (R); ao  

      Height 6 ft. 0 in. (182.88 cm); Pain 0/10;                                                  

23:14  / 89; Pulse 64; Resp 16; Pulse Ox 95% on R/A;                                    ak1 

23:43  / 77; Pulse 60; Resp 18; Temp 97.8; Pulse Ox 95% ; Pain 0/10;                    ak1 

                                                                                             

00:13  / 85; Pulse 56; Resp 16; Temp 97.8(O); Pulse Ox 96% on R/A; Pain 0/10;           ak1 

01:17  / 83; Pulse 59; Resp 16 S; Pulse Ox 96% on R/A;                                  cc3 

02:17  / 80; Pulse 58; Resp 16 S; Pulse Ox 98% on R/A;                                  cc3 

                                                                                             

20:01 Body Mass Index 30.38 (101.60 kg, 182.88 cm)                                            ao  

                                                                                                  

MDM:                                                                                              

                                                                                             

20:12 Patient medically screened.                                                             [facility] 

21:57 Data reviewed: vital signs, nurses notes. Counseling: I had a detailed discussion with  [facility] 

      the patient and/or guardian regarding:.                                                     

22:04 Counseling: I had a detailed discussion with the patient and/or guardian regarding: the [facility] 

      historical points, exam findings, and any diagnostic results supporting the                 

      discharge/admit diagnosis, lab results, the need for outpatient follow up. Transition       

      of care: After a detail discussion of the patient's case, care is transferred to Steve Dallas MD.                                                                                

                                                                                                  

                                                                                             

20:20 Order name: Acetaminophen; Complete Time: 21:42                                         [facility] 

                                                                                             

20:20 Order name: Basic Metabolic Panel; Complete Time: 21:42                                 [facility] 

                                                                                             

20:20 Order name: CBC with Diff; Complete Time: 21:42                                         [facility] 

                                                                                             

20:20 Order name: ETOH Level; Complete Time: 21:42                                            [facility] 

                                                                                             

20:20 Order name: Hepatic Function; Complete Time: 21:42                                      [facility] 

                                                                                             

20:20 Order name: PT-INR; Complete Time: 21:42                                                [facility] 

                                                                                             

20:20 Order name: Ptt, Activated; Complete Time: 21:42                                        [facility] 

                                                                                             

20:20 Order name: Salicylate; Complete Time: 21:42                                            [facility] 

                                                                                             

20:20 Order name: Urine Drug Screen; Complete Time: 23:13                                     [facility] 

                                                                                             

21:46 Order name: Urine Dipstick--Ancillary (enter results); Complete Time: 23:13             ds4 

                                                                                             

23:16 Order name: Phenobarbital                                                               [facility] 

                                                                                             

23:16 Order name: Dilantin                                                                    [facility] 

                                                                                             

20:20 Order name: EKG; Complete Time: 20:40                                                   [facility] 

                                                                                             

20:20 Order name: EKG - Nurse/Tech; Complete Time: 20:40                                      [facility] 

                                                                                             

20:20 Order name: IV Saline Lock; Complete Time: 20:40                                        [facility] 

                                                                                             

20:20 Order name: Labs collected and sent; Complete Time: 20:40                               [facility] 

                                                                                             

20:20 Order name: Urine Dipstick-Ancillary (obtain specimen); Complete Time: 21:45            [facility] 

                                                                                                  

Administered Medications:                                                                         

23:35 Drug: Norvasc 5 mg Route: PO;                                                           cc3 

                                                                                             

00:13 Follow up: Response: No adverse reaction                                                3 

                                                                                             

23:35 Drug: Lopressor 25 mg Route: PO;                                                        cc3 

                                                                                             

00:13 Follow up: Response: No adverse reaction                                                cc3 

00:00 Drug: benazepril 20 mg Route: PO;                                                       cc3 

01:00 Follow up: Response: No adverse reaction                                                cc3 

                                                                                                  

                                                                                                  

Disposition:                                                                                      

                                                                                             

22:05 Co-signature as Attending Physician, Steve Dallas MD.                                 [facility] 

                                                                                                  

Disposition:                                                                                      

18 00:52 Transfer ordered to Psych Facility. Diagnosis are Schizophrenia, Essential         

  (primary) hypertension, Major depressive disorder, recurrent.                                   

- Reason for transfer: Higher level of care.                                                      

- Accepting physician is to dr nguyen, [facility].                                        

- Condition is Stable.                                                                            

- Problem is new.                                                                                 

- Symptoms have worsened.                                                                         

                                                                                                  

                                                                                                  

                                                                                                  

Signatures:                                                                                       

Dispatcher [employer]                           EDSteve Gunter MD MD cha Mickail, Joel, PA PA   [facility]                                                  

Noni Sterling RN                       RN   akStuart Baez RN                         RN   Yamilet Cadet                             cc3                                                  

                                                                                                  

Corrections: (The following items were deleted from the chart)                                    

                                                                                             

02:56 00:52 2018 00:52 Transfer ordered to Psych Facility. Diagnosis is Schizophrenia;  ak1 

      Essential (primary) hypertension; Major depressive disorder, recurrent. Reason for          

      transfer: Higher level of care. Accepting physician is to dr nguyen, [facility]. Condition is Stable. Problem is new. Symptoms have worsened. nesha                

                                                                                                  

**************************************************************************************************

## 2018-11-17 NOTE — EKG
Test Date:    2018-11-16               Test Time:    20:32:32

Technician:   FERMÍN                                    

                                                     

MEASUREMENT RESULTS:                                       

Intervals:                                           

Rate:         79                                     

IN:           192                                    

QRSD:         86                                     

QT:           378                                    

QTc:          433                                    

Axis:                                                

P:            58                                     

IN:           192                                    

QRS:          47                                     

T:            65                                     

                                                     

INTERPRETIVE STATEMENTS:                                       

                                                     

Normal sinus rhythm

Normal ECG

Compared to ECG 06/02/2018 16:27:46

No significant changes



Electronically Signed On 11-17-18 08:03:00 CST by Dajuan Blandon

## 2018-11-17 NOTE — ER
Nurse's Notes                                                                                     

 [facility]                                                                

Name: Tai Reyes                                                                                

Age: 67 yrs                                                                                       

Sex: Male                                                                                         

: 1950                                                                                   

MRN: [id number]                                                                                   

Arrival Date: 2018                                                                          

Time: 19:47                                                                                       

Account#: [id number]                                                                            

Bed 17                                                                                            

Private MD:                                                                                       

Diagnosis: Schizophrenia;Essential (primary) hypertension;Major depressive disorder, recurrent    

                                                                                                  

Presentation:                                                                                     

                                                                                             

19:55 Presenting complaint: EMS states: Patient states, " Hearing the devil telling him to    ao  

      kill himself". Patient states he doesn't want to hurt himself of anyone else and has        

      hearing the devil talking to him for the past two days. Patient states had a similar        

      episode back in the s. Transition of care: patient was not received from another         

      setting of care. Onset of symptoms was September 15, 2018. Risk Assessment: Do you want     

      to hurt yourself or someone else? Other: Patient denies however hears the devil telling     

      to kill himself. Initial Sepsis Screen: Does the patient meet any 2 criteria? No.           

      Patient's initial sepsis screen is negative. Does the patient have a suspected source       

      of infection? No. Patient's initial sepsis screen is negative. Care prior to arrival:       

      None.                                                                                       

19:55 Method Of Arrival: EMS: [facility]                                                ao  

19:55 Acuity: VALENTIN 2                                                                           ao  

                                                                                                  

Triage Assessment:                                                                                

                                                                                             

00:15 General: Appears in no apparent distress. distressed, Behavior is calm, cooperative.    ak1 

      Pain: Denies pain.                                                                          

                                                                                                  

Historical:                                                                                       

- Allergies:                                                                                      

                                                                                             

20:06 No Known Allergies;                                                                     ao  

- Home Meds:                                                                                      

20:06 Latuda 120 mg Oral tab 1 tab once daily [Active]; phenobarbital 100 mg oral tab 1 tab   ao  

      every 8 hours [Active]; excitalopram 20mg daily [Active]; metoprolol tartrate 25 mg         

      Oral tab 1 tab 2 times per day [Active]; amlodipine-benazepril 5-20 mg Oral cap 1 cap       

      once daily [Active]; benztropine 0.5 mg Oral tab 1 tab 2 times per day [Active];            

      Phenytoin 100mg Oral 1 tabs 3 times per day [Active]; lorapine 50mg 2 tab nightly           

      [Active]; clonazepam 1 mg Oral tab 1 tab nightly [Active];                                  

- PMHx:                                                                                           

20:06 colon cancer; Depression; Hypertension; Schizophrenia;                                  ao  

                                                                                             

00:24 Seizures;                                                                               ak1 

- PSHx:                                                                                           

                                                                                             

20:06 None;                                                                                   ao  

                                                                                                  

- Immunization history:: Adult Immunizations up to date.                                          

- Social history:: Smoking status: Patient/guardian denies using tobacco,                         

  Patient/guardian denies using alcohol, street drugs.                                            

- Ebola Screening: : Patient negative for fever greater than or equal to 101.5 degrees            

  Fahrenheit, and additional compatible Ebola Virus Disease symptoms Patient denies               

  exposure to infectious person Patient denies travel to an Ebola-affected area in the            

  21 days before illness onset.                                                                   

                                                                                                  

                                                                                                  

Screenin/17                                                                                             

00:13 Abuse screen: Denies threats or abuse. Denies injuries from another. Nutritional        ak1 

      screening: No deficits noted. Tuberculosis screening: No symptoms or risk factors           

      identified. Fall Risk None identified.                                                      

                                                                                                  

Assessment:                                                                                       

                                                                                             

20:30 General: Appears in no apparent distress. comfortable, Behavior is calm, cooperative.   cc3 

      Pain: Denies pain. Neuro: Level of Consciousness is awake, alert, obeys commands,           

      Oriented to person, place. Cardiovascular: Denies chest pain. Respiratory: Airway is        

      patent Respiratory effort is even, unlabored, Respiratory pattern is regular,               

      symmetrical. GI: Abdomen is round obese. : No signs and/or symptoms were reported         

      regarding the genitourinary system. EENT: No signs and/or symptoms were reported            

      regarding the EENT system. Derm: No signs and/or symptoms reported regarding the            

      dermatologic system. Musculoskeletal: Circulation, motion, and sensation intact. Range      

      of motion: limited in left upper and left lower extremities.                                

21:20 Reassessment: Patient appears in no apparent distress at this time. Patient and/or      cc3 

      family updated on plan of care and expected duration. Pain level reassessed.                

22:30 Reassessment: Patient appears in no apparent distress at this time. Patient and/or      cc3 

      family updated on plan of care and expected duration. Pain level reassessed.                

23:20 Reassessment: Patient appears in no apparent distress at this time. Patient and/or      cc3 

      family updated on plan of care and expected duration. Pain level reassessed.                

                                                                                             

00:15 Reassessment: House Supervisor of [facility] named Mirian called and took      cc3 

      details of the patient and said she'll call back later after she speaks with their          

      physician.                                                                                  

00:28 Reassessment: nurse to nurse with Adore at [facility]. nurse to nurse with Cathy at [facility] .                                                                        

01:19 Reassessment: Patient appears in no apparent distress at this time. Patient and/or      cc3 

      family updated on plan of care and expected duration. Pain level reassessed. Patient        

      comfortably sleeping kept undisturbed.                                                      

02:20 Reassessment: Patient appears in no apparent distress at this time. Patient and/or      cc3 

      family updated on plan of care and expected duration. Pain level reassessed.                

                                                                                                  

Vital Signs:                                                                                      

                                                                                             

20:01  / 86; Pulse 90; Resp 16; Temp 99.1(O); Pulse Ox 95% on R/A; Weight 101.6 kg (R); ao  

      Height 6 ft. 0 in. (182.88 cm); Pain 0/10;                                                  

23:14  / 89; Pulse 64; Resp 16; Pulse Ox 95% on R/A;                                    ak1 

23:43  / 77; Pulse 60; Resp 18; Temp 97.8; Pulse Ox 95% ; Pain 0/10;                    ak1 

                                                                                             

00:13  / 85; Pulse 56; Resp 16; Temp 97.8(O); Pulse Ox 96% on R/A; Pain 0/10;           ak1 

01:17  / 83; Pulse 59; Resp 16 S; Pulse Ox 96% on R/A;                                  cc3 

02:17  / 80; Pulse 58; Resp 16 S; Pulse Ox 98% on R/A;                                  cc3 

                                                                                             

20:01 Body Mass Index 30.38 (101.60 kg, 182.88 cm)                                            ao  

                                                                                                  

ED Course:                                                                                        

                                                                                             

19:47 Patient arrived in ED.                                                                  al2 

20:00 Triage completed.                                                                       ao  

20:01 Arm band placed on right wrist. Patient placed in an exam room, on a stretcher, in view ao  

      of staff members, on pulse oximetry, Patient notified of wait time.                         

20:02 Barber Lucia PA is PHCP.                                                              alexx 

20:02 Steve Dallas MD is Attending Physician.                                             alexx 

20:21 Yamilet Hoover is Primary Nurse.                                                      cc3 

20:30 Inserted saline lock: 20 gauge in right antecubital area, using aseptic technique.      cc3 

      Blood collected.                                                                            

20:41 EKG done, by ED staff, reviewed by Steve Dallas MD.                                   ds4 

21:46 Urine Drug Screen Sent.                                                                 ds4 

                                                                                             

00:13 Patient has correct armband on for positive identification. Bed in low position. Call   ak1 

      light in reach. Side rails up X2. Pulse ox on. NIBP on.                                     

02:33 Report given to GAMA Cheney for continuity of care.                                        cc3 

02:54 No provider procedures requiring assistance completed. IV discontinued, intact,         ak 

      bleeding controlled, No redness/swelling at site. Pressure dressing applied.                

                                                                                                  

Administered Medications:                                                                         

                                                                                             

23:35 Drug: Norvasc 5 mg Route: PO;                                                           cc3 

                                                                                             

00:13 Follow up: Response: No adverse reaction                                                cc3 

                                                                                             

23:35 Drug: Lopressor 25 mg Route: PO;                                                        cc3 

                                                                                             

00:13 Follow up: Response: No adverse reaction                                                cc3 

00:00 Drug: benazepril 20 mg Route: PO;                                                       cc3 

01:00 Follow up: Response: No adverse reaction                                                cc3 

                                                                                                  

                                                                                                  

Outcome:                                                                                          

00:52 ER care complete, transfer ordered by MD.                                               [facility] 

02:54 Transferred by ground EMS Transfer form completed. Note:  report given to tenzin at      [facility]. report given to Eder with  EMS                                     

02:54 Condition: stable                                                                           

02:54 Instructed on the need for transfer.                                                        

02:56 Patient left the ED.                                                                    [facility] 

                                                                                                  

Signatures:                                                                                       

Steve Dallas MD MD cha Mickail, Joel, PA PA jmm Swanson, Donovan                             ds4                                                  

Noni Sterling RN RN   ak1                                                  

Stuart Tafoya RN RN ao Love, Angelica al2 Cordel, Charlene                             cc3                                                  

                                                                                                  

**************************************************************************************************

## 2019-01-03 ENCOUNTER — HOSPITAL ENCOUNTER (INPATIENT)
Dept: HOSPITAL 97 - ER | Age: 69
LOS: 19 days | Discharge: NURSING, REHAB OR LONG-TERM CARE | DRG: 948 | End: 2019-01-22
Attending: INTERNAL MEDICINE | Admitting: INTERNAL MEDICINE
Payer: COMMERCIAL

## 2019-01-03 VITALS — BODY MASS INDEX: 29.5 KG/M2

## 2019-01-03 DIAGNOSIS — K21.9: ICD-10-CM

## 2019-01-03 DIAGNOSIS — E83.42: ICD-10-CM

## 2019-01-03 DIAGNOSIS — E87.0: ICD-10-CM

## 2019-01-03 DIAGNOSIS — J30.9: ICD-10-CM

## 2019-01-03 DIAGNOSIS — K57.90: ICD-10-CM

## 2019-01-03 DIAGNOSIS — N40.1: ICD-10-CM

## 2019-01-03 DIAGNOSIS — Z87.891: ICD-10-CM

## 2019-01-03 DIAGNOSIS — Z90.49: ICD-10-CM

## 2019-01-03 DIAGNOSIS — N28.1: ICD-10-CM

## 2019-01-03 DIAGNOSIS — E78.2: ICD-10-CM

## 2019-01-03 DIAGNOSIS — R53.1: ICD-10-CM

## 2019-01-03 DIAGNOSIS — E83.51: ICD-10-CM

## 2019-01-03 DIAGNOSIS — R73.01: ICD-10-CM

## 2019-01-03 DIAGNOSIS — D63.8: ICD-10-CM

## 2019-01-03 DIAGNOSIS — E87.6: ICD-10-CM

## 2019-01-03 DIAGNOSIS — F20.9: ICD-10-CM

## 2019-01-03 DIAGNOSIS — G40.909: ICD-10-CM

## 2019-01-03 DIAGNOSIS — N17.9: ICD-10-CM

## 2019-01-03 DIAGNOSIS — I10: ICD-10-CM

## 2019-01-03 DIAGNOSIS — Z85.118: ICD-10-CM

## 2019-01-03 DIAGNOSIS — M62.82: ICD-10-CM

## 2019-01-03 DIAGNOSIS — E88.09: ICD-10-CM

## 2019-01-03 DIAGNOSIS — N31.8: ICD-10-CM

## 2019-01-03 DIAGNOSIS — N39.0: ICD-10-CM

## 2019-01-03 DIAGNOSIS — R33.8: ICD-10-CM
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
## 2019-01-03 DIAGNOSIS — R41.82: Primary | ICD-10-CM

## 2019-01-03 DIAGNOSIS — Z86.73: ICD-10-CM

## 2019-01-03 DIAGNOSIS — M15.9: ICD-10-CM

## 2019-01-03 DIAGNOSIS — Z85.038: ICD-10-CM

## 2019-01-03 DIAGNOSIS — T42.3X5A: ICD-10-CM

## 2019-01-03 LAB
ALBUMIN SERPL BCP-MCNC: 3.2 G/DL (ref 3.4–5)
ALP SERPL-CCNC: 77 U/L (ref 45–117)
ALT SERPL W P-5'-P-CCNC: 17 U/L (ref 12–78)
AST SERPL W P-5'-P-CCNC: 28 U/L (ref 15–37)
BUN BLD-MCNC: 27 MG/DL (ref 7–18)
CKMB CREATINE KINASE MB: 2.9 NG/ML (ref 0.3–3.6)
GLUCOSE SERPLBLD-MCNC: 109 MG/DL (ref 74–106)
HCT VFR BLD CALC: 38.9 % (ref 39.6–49)
INR BLD: 1.28
LIPASE SERPL-CCNC: 47 U/L (ref 73–393)
LYMPHOCYTES # SPEC AUTO: 0.7 K/UL (ref 0.7–4.9)
PMV BLD: 9.1 FL (ref 7.6–11.3)
POTASSIUM SERPL-SCNC: 3.5 MMOL/L (ref 3.5–5.1)
RBC # BLD: 3.97 M/UL (ref 4.33–5.43)
TROPONIN (EMERG DEPT USE ONLY): < 0.02 NG/ML (ref 0–0.04)
UA COMPLETE W REFLEX CULTURE PNL UR: (no result)

## 2019-01-03 PROCEDURE — 84484 ASSAY OF TROPONIN QUANT: CPT

## 2019-01-03 PROCEDURE — 81001 URINALYSIS AUTO W/SCOPE: CPT

## 2019-01-03 PROCEDURE — 80185 ASSAY OF PHENYTOIN TOTAL: CPT

## 2019-01-03 PROCEDURE — 81003 URINALYSIS AUTO W/O SCOPE: CPT

## 2019-01-03 PROCEDURE — 81015 MICROSCOPIC EXAM OF URINE: CPT

## 2019-01-03 PROCEDURE — 80202 ASSAY OF VANCOMYCIN: CPT

## 2019-01-03 PROCEDURE — 93005 ELECTROCARDIOGRAM TRACING: CPT

## 2019-01-03 PROCEDURE — 88108 CYTOPATH CONCENTRATE TECH: CPT

## 2019-01-03 PROCEDURE — 90670 PCV13 VACCINE IM: CPT

## 2019-01-03 PROCEDURE — 51702 INSERT TEMP BLADDER CATH: CPT

## 2019-01-03 PROCEDURE — 84300 ASSAY OF URINE SODIUM: CPT

## 2019-01-03 PROCEDURE — 84145 PROCALCITONIN (PCT): CPT

## 2019-01-03 PROCEDURE — 97163 PT EVAL HIGH COMPLEX 45 MIN: CPT

## 2019-01-03 PROCEDURE — 76770 US EXAM ABDO BACK WALL COMP: CPT

## 2019-01-03 PROCEDURE — 83605 ASSAY OF LACTIC ACID: CPT

## 2019-01-03 PROCEDURE — 82550 ASSAY OF CK (CPK): CPT

## 2019-01-03 PROCEDURE — 82570 ASSAY OF URINE CREATININE: CPT

## 2019-01-03 PROCEDURE — 80048 BASIC METABOLIC PNL TOTAL CA: CPT

## 2019-01-03 PROCEDURE — 36415 COLL VENOUS BLD VENIPUNCTURE: CPT

## 2019-01-03 PROCEDURE — 83690 ASSAY OF LIPASE: CPT

## 2019-01-03 PROCEDURE — 85025 COMPLETE CBC W/AUTO DIFF WBC: CPT

## 2019-01-03 PROCEDURE — 97530 THERAPEUTIC ACTIVITIES: CPT

## 2019-01-03 PROCEDURE — 97542 WHEELCHAIR MNGMENT TRAINING: CPT

## 2019-01-03 PROCEDURE — 80053 COMPREHEN METABOLIC PANEL: CPT

## 2019-01-03 PROCEDURE — 83874 ASSAY OF MYOGLOBIN: CPT

## 2019-01-03 PROCEDURE — 97164 PT RE-EVAL EST PLAN CARE: CPT

## 2019-01-03 PROCEDURE — 84100 ASSAY OF PHOSPHORUS: CPT

## 2019-01-03 PROCEDURE — 87088 URINE BACTERIA CULTURE: CPT

## 2019-01-03 PROCEDURE — 84550 ASSAY OF BLOOD/URIC ACID: CPT

## 2019-01-03 PROCEDURE — 80184 ASSAY OF PHENOBARBITAL: CPT

## 2019-01-03 PROCEDURE — 84132 ASSAY OF SERUM POTASSIUM: CPT

## 2019-01-03 PROCEDURE — 70450 CT HEAD/BRAIN W/O DYE: CPT

## 2019-01-03 PROCEDURE — 99285 EMERGENCY DEPT VISIT HI MDM: CPT

## 2019-01-03 PROCEDURE — 87086 URINE CULTURE/COLONY COUNT: CPT

## 2019-01-03 PROCEDURE — 87040 BLOOD CULTURE FOR BACTERIA: CPT

## 2019-01-03 PROCEDURE — 85730 THROMBOPLASTIN TIME PARTIAL: CPT

## 2019-01-03 PROCEDURE — 85610 PROTHROMBIN TIME: CPT

## 2019-01-03 PROCEDURE — 83735 ASSAY OF MAGNESIUM: CPT

## 2019-01-03 PROCEDURE — 74177 CT ABD & PELVIS W/CONTRAST: CPT

## 2019-01-03 PROCEDURE — 71045 X-RAY EXAM CHEST 1 VIEW: CPT

## 2019-01-03 PROCEDURE — 97116 GAIT TRAINING THERAPY: CPT

## 2019-01-03 PROCEDURE — 82553 CREATINE MB FRACTION: CPT

## 2019-01-03 PROCEDURE — 80076 HEPATIC FUNCTION PANEL: CPT

## 2019-01-03 PROCEDURE — 82962 GLUCOSE BLOOD TEST: CPT

## 2019-01-03 RX ADMIN — DEXTROSE AND SODIUM CHLORIDE SCH MLS: 5; .9 INJECTION, SOLUTION INTRAVENOUS at 21:18

## 2019-01-03 RX ADMIN — Medication SCH ML: at 21:18

## 2019-01-03 NOTE — EDPHYS
Physician Documentation                                                                           

 [facility]                                                                

Name: Tai Reyes                                                                                

Age: 68 yrs                                                                                       

Sex: Male                                                                                         

: 1950                                                                                   

MRN: [id number]                                                                                   

Arrival Date: 2019                                                                          

Time: 13:42                                                                                       

Account#: [id number]                                                                            

Bed 2                                                                                             

Private MD:                                                                                       

ED Physician Reece Cross                                                                       

HPI:                                                                                              

                                                                                             

14:01 This 68 yrs old  Male presents to ER via EMS with complaints of General        kdr 

      Weakness.                                                                                   

14:01 The patient was sent from the NH because he was leaning more than usual at breakfast    kdr 

      and not eating as much. Onset: The symptoms/episode began/occurred this morning.            

      Severity of symptoms: At their worst the symptoms were mild in the emergency department     

      the symptoms The patient is reported to be at his baseline on presentation to the ED.       

      Unable to elicit any history or discernable problem at time of presentation. It is          

      unknown whether or not the patient has had similar symptoms in the past. It is unknown      

      whether or not the patient has recently seen a physician.                                   

                                                                                                  

Historical:                                                                                       

- Allergies:                                                                                      

13:47 No Known Allergies;                                                                     tw2 

- Home Meds:                                                                                      

13:47 amlodipine-benazepril 5-20 mg Oral cap 1 cap once daily [Active]; benztropine 0.5 mg    tw2 

      Oral tab 1 tab 2 times per day [Active]; clonazepam 1 mg Oral tab 1 tab nightly             

      [Active]; excitalopram 20mg daily [Active]; Latuda 120 mg Oral tab 1 tab once daily         

      [Active]; metoprolol tartrate 25 mg Oral tab 1 tab 2 times per day [Active]; lorapine       

      50mg 2 tab nightly [Active]; phenobarbital 100 mg Oral tab 1 tab every 8 hours              

      [Active]; Phenytoin 100mg Oral 1 tabs 3 times per day [Active];                             

- PMHx:                                                                                           

13:47 colon cancer; CVA; Depression; Hypertension; Schizophrenia; Seizures;                   tw2 

- PSHx:                                                                                           

13:47 None;                                                                                   tw2 

                                                                                                  

- Immunization history:: Adult Immunizations.                                                     

- Social history:: Smoking status: .                                                              

- Ebola Screening: : Patient denies exposure to infectious person Patient denies travel           

  to an Ebola-affected area in the 21 days before illness onset.                                  

                                                                                                  

                                                                                                  

ROS:                                                                                              

14:01 Unable to obtain ROS due to altered mental status, patient is in a persistent           kdr 

      vegetative state, patient's speech is incomprehensible.                                     

16:42 Constitutional: Negative for fever, chills, and weight loss, Eyes: Negative for injury, kdr 

      pain, redness, and discharge.                                                               

                                                                                                  

Exam:                                                                                             

14:01 Constitutional:  This is a well developed, well nourished patient who is awake, alert,  kdr 

      and in no acute distress. Head/Face:  Normocephalic, atraumatic loeft facial droop          

      (chronic) Eyes:  Pupils equal round and reactive to light, extra-ocular motions intact.     

       Lids and lashes normal.  Conjunctiva and sclera are non-icteric and not injected.          

      Cornea within normal limits.  Periorbital areas with no swelling, redness, or edema.        

      Neck:  Trachea midline, no thyromegaly or masses palpated, and no cervical                  

      lymphadenopathy.  Supple, full range of motion without nuchal rigidity, or vertebral        

      point tenderness.  No Meningismus. Chest/axilla:  Normal chest wall appearance and          

      motion.  Nontender with no deformity.  No lesions are appreciated. Cardiovascular:          

      Regular rate and rhythm with a normal S1 and S2.  No gallops, murmurs, or rubs.  Normal     

      PMI, no JVD.  No pulse deficits. Abdomen/GI:  Soft, non-tender, with normal bowel           

      sounds.  No distension or tympany.  No guarding or rebound.  No evidence of tenderness      

      throughout. Back:  No spinal tenderness.  No costovertebral tenderness.  Full range of      

      motion. Skin:  Warm, dry with normal turgor.  Normal color with no rashes, no lesions,      

      and no evidence of cellulitis. MS/ Extremity:  Pulses equal, no cyanosis.                   

      Neurovascular intact.  Full, normal range of motion. Neuro:  Awake and alert, GCS 14.       

      Cranial nerves grossly intact.                                                              

                                                                                                  

Vital Signs:                                                                                      

13:43  / 83; Pulse 68; Resp 16; Temp 97.7(O); Pulse Ox 99% on R/A; Weight 104.33 kg     tw2 

      (R); Pain 0/10;                                                                             

14:34  / 82; Pulse 70; Resp 16; Pulse Ox 99% on R/A;                                    tw2 

15:30 BP 94 / 61; Pulse 65; Resp 13; Pulse Ox 97% on R/A;                                     tw2 

16:27 BP 93 / 66; Pulse 64; Resp 14; Pulse Ox 97% on R/A;                                     tw2 

17:16  / 77; Pulse 71; Resp 16; Pulse Ox 99% on R/A;                                    tw2 

                                                                                                  

MDM:                                                                                              

16:41 Patient medically screened.                                                             kdr 

16:51 Data reviewed: vital signs, nurses notes. Counseling: I had a detailed discussion with  kdr 

      the patient and/or guardian regarding: the historical points, exam findings, and any        

      diagnostic results supporting the discharge/admit diagnosis, lab results, radiology         

      results, the need for outpatient follow up.                                                 

                                                                                                  

                                                                                             

13:46 Order name: Glucose, Ancillary Testing; Complete Time: 15:18                            EDMS

                                                                                             

13:56 Order name: Basic Metabolic Panel; Complete Time: 15:18                                 [facility] 

                                                                                             

13:56 Order name: Blood Culture Adult (2)                                                     kdr 

                                                                                             

13:56 Order name: CBC with Diff; Complete Time: 15:18                                         kdr 

                                                                                             

13:56 Order name: Ckmb; Complete Time: 15:18                                                  kdr 

                                                                                             

13:56 Order name: CPK; Complete Time: 15:18                                                   kdr 

                                                                                             

13:56 Order name: Lactate; Complete Time: 15:18                                               kdr 

                                                                                             

13:56 Order name: LFT's; Complete Time: 15:18                                                 [facility] 

                                                                                             

13:56 Order name: Lipase; Complete Time: 15:18                                                kdr 

                                                                                             

13:56 Order name: Procalcitonin; Complete Time: 15:18                                         kdr 

                                                                                             

13:56 Order name: Protime (+inr); Complete Time: 15:18                                        kdr 

                                                                                             

13:56 Order name: Ptt, Activated; Complete Time: 15:18                                        kdr 

                                                                                             

13:56 Order name: Troponin (emerg Dept Use Only); Complete Time: 15:18                        [facility] 

                                                                                             

13:56 Order name: Urine Microscopic Only; Complete Time: 15:18                                [facility] 

                                                                                             

13:56 Order name: Chest Single View XRAY; Complete Time: 15:18                                kdr 

                                                                                             

13:56 Order name: Accucheck; Complete Time: 13:59                                             kdr 

                                                                                             

13:56 Order name: Cardiac monitoring; Complete Time: 13:59                                    kdr 

                                                                                             

13:56 Order name: EKG - Nurse/Tech; Complete Time: 14:14                                      kdr 

                                                                                             

13:56 Order name: IV Saline Lock - Large Bore; Complete Time: 13:59                           [facility] 

                                                                                             

13:56 Order name: Labs collected and sent; Complete Time: 14:00                               kdr 

                                                                                             

13:56 Order name: O2 Per Protocol; Complete Time: 14:00                                       kdr 

                                                                                             

13:56 Order name: O2 Sat Monitoring; Complete Time: 14:00                                     kdr 

                                                                                             

14:08 Order name: Phenobarbital; Complete Time: 15:18                                         hb  

                                                                                             

14:08 Order name: Dilantin; Complete Time: 15:18                                              hb  

                                                                                             

14:37 Order name: Urine Dipstick--Ancillary (enter results); Complete Time: 16:30             hb  

                                                                                             

16:56 Order name: Regular                                                                     EDMS

                                                                                             

16:56 Order name: Basic Metabolic Panel                                                       EDMS

                                                                                             

16:56 Order name: Basic Metabolic Panel                                                       EDMS

                                                                                             

16:56 Order name: CBC with Automated Diff                                                     EDMS

                                                                                             

16:56 Order name: CBC with Automated Diff                                                     EDMS

                                                                                             

13:56 Order name: Urine Dipstick-Ancillary (obtain specimen); Complete Time: 15:16            kdr 

                                                                                             

14:08 Order name: Morales; Complete Time: 14:35                                                 tw2 

                                                                                                  

Administered Medications:                                                                         

No medications were administered                                                                  

                                                                                                  

                                                                                                  

Point of Care Testing:                                                                            

      Blood Glucose:                                                                              

13:43 Blood Glucose: 117 mg/dL;                                                               tw2 

      Ranges:                                                                                     

      Critical Glucose Levels:Adult <50 mg/dl or >400 mg/dl  <40 mg/dl or >180 mg/dl       

Disposition:                                                                                      

19 16:41 Hospitalization ordered by DENZEL Petty for Inpatient Admission. Preliminary            

  diagnosis is Phenobarbital Toxicity.                                                            

- Bed requested for Telemetry/MedSurg (Inpatient).                                                

- Status is Inpatient Admission.                                                              tw2 

- Condition is Fair.                                                                              

- Problem is new.                                                                                 

- Symptoms are unchanged.                                                                         

UTI on Admission? No                                                                              

                                                                                                  

                                                                                                  

                                                                                                  

Signatures:                                                                                       

[facility]                                                 

Milena Miller RN                        RN                                                      

Reece Cross MD MD   [facility]                                                  

Deepti Ferreira RN                          RN   tw2                                                  

                                                                                                  

Corrections: (The following items were deleted from the chart)                                    

17:16 16:41 Hospitalization Ordered by A Malinda RIGGINS for Inpatient Admission. Preliminary         maximilian  

      diagnosis is Phenobarbital Toxicity. Bed requested for Telemetry/MedSurg (Inpatient).       

      Status is Inpatient Admission. Condition is Fair. Problem is new. Symptoms are              

      unchanged. UTI on Admission? No. kdr                                                        

17:43 17:16 2019 16:41 Hospitalization Ordered by A Malinda RIGGINS for Inpatient Admission.    tw2 

      Preliminary diagnosis is Phenobarbital Toxicity. Bed requested for Telemetry/MedSurg        

      (Inpatient). Status is Inpatient Admission. Condition is Fair. Problem is new. Symptoms     

      are unchanged. UTI on Admission? No. dw                                                     

                                                                                                  

**************************************************************************************************

## 2019-01-03 NOTE — ER
Nurse's Notes                                                                                     

 [facility]                                                                

Name: Tai Reyes                                                                                

Age: 68 yrs                                                                                       

Sex: Male                                                                                         

: 1950                                                                                   

MRN: [id number]                                                                                   

Arrival Date: 2019                                                                          

Time: 13:42                                                                                       

Account#: [id number]                                                                            

Bed 2                                                                                             

Private MD:                                                                                       

Diagnosis: Phenobarbital Toxicity                                                                 

                                                                                                  

Presentation:                                                                                     

                                                                                             

13:42 Presenting complaint: EMS states: pt is from [facility], staff called       tw2 

      saying he wasn't acting right this morning at breakfast, normally he has slurred speech     

      with left sided weakness from a previous CVA but this morning he seemed weaker and "not     

      right", last known normal was last night. Transition of care: patient was received from     

      another setting of care (long-term care facility), [facility]. Onset of         

      symptoms was 2019. Risk Assessment: Do you want to hurt yourself or someone     

      else? Patient reports no desire to harm self or others. Care prior to arrival: None.        

13:42 Method Of Arrival: EMS: [facility]                                                       tw2 

13:42 Acuity: VALENTIN 2                                                                           tw2 

13:44 Initial Sepsis Screen: Does the patient meet any 2 criteria? No. Patient's initial      tw2 

      sepsis screen is negative. Does the patient have a suspected source of infection? No.       

      Patient's initial sepsis screen is negative.                                                

                                                                                                  

Historical:                                                                                       

- Allergies:                                                                                      

13:47 No Known Allergies;                                                                     tw2 

- Home Meds:                                                                                      

13:47 amlodipine-benazepril 5-20 mg Oral cap 1 cap once daily [Active]; benztropine 0.5 mg    tw2 

      Oral tab 1 tab 2 times per day [Active]; clonazepam 1 mg Oral tab 1 tab nightly             

      [Active]; excitalopram 20mg daily [Active]; Latuda 120 mg Oral tab 1 tab once daily         

      [Active]; metoprolol tartrate 25 mg Oral tab 1 tab 2 times per day [Active]; lorapine       

      50mg 2 tab nightly [Active]; phenobarbital 100 mg Oral tab 1 tab every 8 hours              

      [Active]; Phenytoin 100mg Oral 1 tabs 3 times per day [Active];                             

- PMHx:                                                                                           

13:47 colon cancer; CVA; Depression; Hypertension; Schizophrenia; Seizures;                   tw2 

- PSHx:                                                                                           

13:47 None;                                                                                   tw2 

                                                                                                  

- Immunization history:: Adult Immunizations.                                                     

- Social history:: Smoking status: .                                                              

- Ebola Screening: : Patient denies exposure to infectious person Patient denies travel           

  to an Ebola-affected area in the 21 days before illness onset.                                  

                                                                                                  

                                                                                                  

Screenin:50 Abuse screen: Denies threats or abuse. Nutritional screening: No deficits noted.        tw2 

      Tuberculosis screening: No symptoms or risk factors identified. Fall Risk None              

      identified.                                                                                 

                                                                                                  

Assessment:                                                                                       

13:47 General: Appears unkempt, Behavior is appropriate for age. Pain: Denies pain. Neuro:    tw2 

      Level of Consciousness is awake, alert, obeys commands, Oriented to person, place.          

      Cardiovascular: Heart tones S1 S2 Patient's skin is warm and dry. Respiratory: Airway       

      is patent Respiratory effort is even, unlabored, Respiratory pattern is regular,            

      symmetrical, Breath sounds are clear bilaterally. GI: No signs and/or symptoms were         

      reported involving the gastrointestinal system. Abdomen is round non-distended, Bowel       

      sounds present X 4 quads. : No signs and/or symptoms were reported regarding the          

      genitourinary system. EENT: No signs and/or symptoms were reported regarding the EENT       

      system. Derm: No signs and/or symptoms reported regarding the dermatologic system. Skin     

      is intact, Skin is dry. Musculoskeletal: Range of motion: limited in left elbow and         

      left wrist.                                                                                 

13:49 Neuro: Speech is slurred, but staff at [facility] says this is his normal. Facial droop  tw2 

      on left, per staff at [facility] state this is pts normal.                                   

14:14 Reassessment: xray is at bedside at this time.                                          tw2 

14:35 Reassessment: Patient appears in no apparent distress at this time. No changes from     tw2 

      previously documented assessment. Patient and/or family updated on plan of care and         

      expected duration. Pain level reassessed.                                                   

15:30 Reassessment: Patient appears in no apparent distress at this time. No changes from     tw2 

      previously documented assessment. Patient and/or family updated on plan of care and         

      expected duration. Pain level reassessed.                                                   

16:27 Reassessment: Patient appears in no apparent distress at this time. No changes from     tw2 

      previously documented assessment. Patient and/or family updated on plan of care and         

      expected duration. Pain level reassessed.                                                   

17:16 Reassessment: Patient appears in no apparent distress at this time. No changes from     tw2 

      previously documented assessment. Patient and/or family updated on plan of care and         

      expected duration. Pain level reassessed.                                                   

                                                                                                  

Vital Signs:                                                                                      

13:43  / 83; Pulse 68; Resp 16; Temp 97.7(O); Pulse Ox 99% on R/A; Weight 104.33 kg     tw2 

      (R); Pain 0/10;                                                                             

14:34  / 82; Pulse 70; Resp 16; Pulse Ox 99% on R/A;                                    tw2 

15:30 BP 94 / 61; Pulse 65; Resp 13; Pulse Ox 97% on R/A;                                     tw2 

16:27 BP 93 / 66; Pulse 64; Resp 14; Pulse Ox 97% on R/A;                                     tw2 

17:16  / 77; Pulse 71; Resp 16; Pulse Ox 99% on R/A;                                    tw2 

                                                                                                  

ED Course:                                                                                        

13:42 Patient arrived in ED.                                                                  tw2 

13:43 Triage completed.                                                                       tw2 

13:44 Arm band placed on.                                                                     tw2 

13:46 Reece Cross MD is Attending Physician.                                              kdr 

13:47 Bed in low position. Call light in reach. Side rails up X2. Cardiac monitor on. Pulse   tw2 

      ox on. NIBP on.                                                                             

13:47 Inserted saline lock: 20 gauge in right antecubital area, using aseptic technique.      tw2 

      ,using aseptic technique. by GAMA Daley Blood collected.                                    

13:59 Ferreira, Deepti, RN is Primary Nurse.                                                        tw2 

14:09 Dilantin Sent.                                                                          tw2 

14:09 Phenobarbital Sent.                                                                     tw2 

14:18 X-ray completed. Portable x-ray completed in exam room. Patient tolerated procedure     jb2 

      well.                                                                                       

14:18 Chest Single View XRAY In Process Unspecified.                                          EDMS

14:20 Morales cath inserted, using sterile technique, 18 Fr., by me, balloon inflated, to       tw2 

      gravity drainage, urine specimen collected. Joselyn Westfall served as chaperone           

      during procedure, pt had dried stool, we cleaned pt and applied new brief at this time.     

14:24 EKG done, by EKG tech. reviewed by Reece Cross MD.                                    sm3 

15:01 Notified ED physician of a critical lab result(s). phenobarb 76.5.                      hb  

16:35 DENZEL Petty MD is Hospitalizing Provider.                                                  kdr 

17:20 No provider procedures requiring assistance completed. Patient admitted, IV remains in  tw2 

      place.                                                                                      

17:21 Awaiting: attempted to call report at this time.                                        tw2 

                                                                                                  

Administered Medications:                                                                         

No medications were administered                                                                  

                                                                                                  

                                                                                                  

Point of Care Testing:                                                                            

      Blood Glucose:                                                                              

13:43 Blood Glucose: 117 mg/dL;                                                               tw2 

      Ranges:                                                                                     

                                                                                                  

Intake:                                                                                           

16:40 dark rodegr urine noted                                                                  tw2 

                                                                                                  

Output:                                                                                           

16:40 Urine: 380ml (Morales); Total: 380ml.                                                     tw2 

16:40 dark rodger urine noted                                                                  tw2 

                                                                                                  

Outcome:                                                                                          

16:41 Decision to Hospitalize by Provider.                                                    kdr 

17:36 Admitted to Med/surg accompanied by tech, via stretcher, room 228, with chart, Report   tw2 

      called to  GAMA Lozada                                                                           

17:36 Condition: stable                                                                           

17:36 Instructed on the need for admit.                                                           

17:43 Patient left the ED.                                                                    tw2 

                                                                                                  

Signatures:                                                                                       

Dispatcher [employer]                           EDMS                                                 

Reece Cross MD MD kdr Buechter, Jesse                              jb2                                                  

Thuy Michael RN                     RN                                                      

Deepti Ferreira RN                          RN   tw2                                                  

Lorena Rice                              3                                                  

                                                                                                  

**************************************************************************************************

## 2019-01-03 NOTE — XMS REPORT
Continuity of Care Document

 Created on:2019



Patient:MARISOL TILLMAN

Sex:Male

:1950

External Reference #:[id number]





Demographics







 Address  [address] [place] [address] [address]



   [place] [address]

 

 Phone  [phone]

 

 Preferred Language  Unknown

 

 Marital Status  Unknown

 

 Baptism Affiliation  Unknown

 

 Race  Unknown

 

 Ethnic Group  Unknown









Author







 Organization  [employer]









Problems







 Problem  Status  Onset Date  Classification  Date  Comments  Source



         Reported    



             



             







Medications







 Medication  Details  Route  Status  Patient  Ordering  Order  Source



         Instructions  Provider  Date  



               



               



               







Allergies, Adverse Reactions, Alerts







 Substance  Category  Reaction  Severity  Reaction  Status  Date  Comments  
Source



         type    Reported    



                 



                 







Immunizations







 Immunization  Date Given  Site  Status  Last Updated  Comments  Source



             



             







Results







 Order  Results  Value  Reference  Date  Interpretation  Comments  Source



 Name      Range        



               



               







Vital Signs







 Vital Sign  Value  Date  Comments  Source



         







Encounters







 Location  Location  Encounter  Encounter  Reason  Attending  ADM  DC  Status  
Source



   Details  Type  Number  For  Provider  Date  Date    



         Visit          



                   



                   



                   

 

     Outpatient  [id number]    MYNOR  08/15    [facility]        [place]



                   



                   



                   



                   

 

     Outpatient  [id number]    MYNOR      [facility]        [place]



                   



                   



                   



                   







Procedures







 Procedure  Code  Date  Perfomer  Comments  Source

## 2019-01-03 NOTE — RAD REPORT
EXAM DESCRIPTION:  RAD - Chest Single View - 1/3/2019 2:18 pm

 

CLINICAL HISTORY:  Altered mental status, possible pneumonia

 

COMPARISON:  June 2018

 

TECHNIQUE:  AP portable chest image was obtained 1405 hours .

 

FINDINGS:  Lung volumes are low. Right costophrenic angle blunting is similar to the comparison. No p
ulmonary edema pattern seen. Postsurgical changes are noted at the right hilum. No focal pneumonia se
en. Posterior lung base assessment is limited. Trachea is midline. Heart and vasculature are normal. 
No pneumothorax or large pleural effusion. No acute bony abnormality seen. No acute aortic findings s
uspected.

 

IMPRESSION:  Limited shallow inspiration film without convincing evidence for pneumonia. Lung marking
s are accentuated by shallow inspiration.

 

No pulmonary edema.

## 2019-01-04 LAB
BUN BLD-MCNC: 23 MG/DL (ref 7–18)
GLUCOSE SERPLBLD-MCNC: 94 MG/DL (ref 74–106)
HCT VFR BLD CALC: 36.9 % (ref 39.6–49)
LYMPHOCYTES # SPEC AUTO: 0.9 K/UL (ref 0.7–4.9)
PMV BLD: 8.8 FL (ref 7.6–11.3)
POTASSIUM SERPL-SCNC: 3.3 MMOL/L (ref 3.5–5.1)
RBC # BLD: 3.84 M/UL (ref 4.33–5.43)

## 2019-01-04 RX ADMIN — Medication SCH ML: at 09:04

## 2019-01-04 RX ADMIN — PHENYTOIN SODIUM SCH MG: 100 CAPSULE, EXTENDED RELEASE ORAL at 20:57

## 2019-01-04 RX ADMIN — TAMSULOSIN HYDROCHLORIDE SCH MG: 0.4 CAPSULE ORAL at 20:56

## 2019-01-04 RX ADMIN — DEXTROSE AND SODIUM CHLORIDE SCH MLS: 5; .9 INJECTION, SOLUTION INTRAVENOUS at 18:53

## 2019-01-04 RX ADMIN — Medication SCH: at 20:57

## 2019-01-04 RX ADMIN — METOPROLOL TARTRATE SCH MG: 25 TABLET ORAL at 20:56

## 2019-01-04 RX ADMIN — DEXTROSE AND SODIUM CHLORIDE SCH: 5; .9 INJECTION, SOLUTION INTRAVENOUS at 23:40

## 2019-01-04 NOTE — EKG
Test Date:    2019-01-03               Test Time:    14:07:09

Technician:   JEROMY                                     

                                                     

MEASUREMENT RESULTS:                                       

Intervals:                                           

Rate:         71                                     

WI:           200                                    

QRSD:         90                                     

QT:           408                                    

QTc:          443                                    

Axis:                                                

P:            54                                     

WI:           200                                    

QRS:          71                                     

T:            50                                     

                                                     

INTERPRETIVE STATEMENTS:                                       

                                                     

Normal sinus rhythm

Normal ECG

Compared to ECG 11/16/2018 20:32:32

No significant changes



Electronically Signed On 01-04-19 07:46:19 CST by Amrand Moseley

## 2019-01-05 LAB
BUN BLD-MCNC: 17 MG/DL (ref 7–18)
GLUCOSE SERPLBLD-MCNC: 118 MG/DL (ref 74–106)
POTASSIUM SERPL-SCNC: 3.3 MMOL/L (ref 3.5–5.1)

## 2019-01-05 RX ADMIN — METOPROLOL TARTRATE SCH MG: 25 TABLET ORAL at 21:16

## 2019-01-05 RX ADMIN — Medication SCH: at 21:00

## 2019-01-05 RX ADMIN — PHENYTOIN SODIUM SCH MG: 100 CAPSULE, EXTENDED RELEASE ORAL at 09:10

## 2019-01-05 RX ADMIN — TAMSULOSIN HYDROCHLORIDE SCH MG: 0.4 CAPSULE ORAL at 21:15

## 2019-01-05 RX ADMIN — DEXTROSE AND SODIUM CHLORIDE SCH MLS: 5; .9 INJECTION, SOLUTION INTRAVENOUS at 09:06

## 2019-01-05 RX ADMIN — METOPROLOL TARTRATE SCH MG: 25 TABLET ORAL at 09:11

## 2019-01-05 RX ADMIN — PHENYTOIN SODIUM SCH MG: 100 CAPSULE, EXTENDED RELEASE ORAL at 13:09

## 2019-01-05 RX ADMIN — Medication SCH: at 09:00

## 2019-01-05 RX ADMIN — DEXTROSE AND SODIUM CHLORIDE SCH MLS: 5; .9 INJECTION, SOLUTION INTRAVENOUS at 21:16

## 2019-01-05 RX ADMIN — DEXTROSE AND SODIUM CHLORIDE SCH: 5; .9 INJECTION, SOLUTION INTRAVENOUS at 13:00

## 2019-01-05 RX ADMIN — PHENYTOIN SODIUM SCH MG: 100 CAPSULE, EXTENDED RELEASE ORAL at 21:16

## 2019-01-05 NOTE — PN
Date of Progress Note:  01/05/2019



Subjective:  The patient was seen this morning for followup.  He was sleeping, easily arousable, not 
in distress.  Denied any complaints.



Objective:  Vital Signs:  Reviewed. 

HEENT:  Unremarkable. 

Lungs:  Clear to auscultation. 

Heart:  Sounds normal. 

Abdomen:  Soft.  Bowel sounds normal.  No guarding, rigidity, tenderness, or distention. 

Extremities:  No leg edema.



Laboratory Data:  Sodium 146, potassium 3.3, chloride 111, bicarb 27, BUN 17, creatinine 0.80, glucos
e 118.  Phenobarbital level was 66.6 today, yesterday it was 63.3.



Impression:  

1.Phenobarbital toxicity.

2.Seizure disorder.

3.Schizophrenia.

4.Hypertension.

5.Hypokalemia.



Plan:  We will go ahead and replace potassium per order.  We will repeat blood work.  Continue IV flu
id.  The patient's home medications will be continued per order.  We are not giving him any lisinopri
l yet because his blood pressure is normal without taking this medication, at this point, and we will
 consider to restart it at appropriate time.  The patient has not received any phenobarbital since ad
mission also.  We will continue to monitor phenobarbital level on a daily basis, and we will repeat b
lood work tomorrow.  Possible discharge to go back to nursing home day after tomorrow.  Details were 
discussed with the 

patient.  He still appears sleepier than normal, but better compared to the day of presentation.





ZAIRE/MODL

DD:  01/05/2019 13:16:14Voice ID:  [id number]

DT:  01/05/2019 15:09:21Report ID:  [id number]

## 2019-01-05 NOTE — HP
Date of Admission:  01/03/2019



Chief Complaint:  Altered mental status.



History Of Present Illness:  A 68-year-old male patient who was under my care for many years until so
metime in November of this year.  He was hearing some voices and he was brought into our emergency ro
om.  At that time, he was living at independent facility across the street from hospital called [facility].  From our hospital [facility] was [facility] [place], where he wa
s kept for about 2 weeks and as family informs me today about all these details, they told me that at
 end of 2 weeks, family was contacted by this facility and they were told that he is ready for discha
rge and for them to come and pick him up.  When they went to that facility, they found him sitting in
 wheelchair and he was discharged with some discharge medications.  When family brought him home, the
y realized that he was extremely weak, that they had hard time getting him out of car to the house.  
Family was not able to take care of him as they work and rather the patient was not able to take care
 of himself because of his condition, so they contacted the nursing home and placed him in [facility] and all the paperwork that they got from [place], they gave it to the nursing home, that
 included his medication list, and as I understand from family that this medication list were continu
ed at the nursing home.  He has not done well in nursing home.  He has been very sleepy and weak to t
he extent that today he was sent to the emergency room because of altered mental status.  After he wa
s evaluated in the ER, we found out that the patient actually had phenobarbital toxicity and I was co
ntacted requesting admission to the hospital for this problem.  When I saw him in the evening, he was
 lying in bed, trying to open his eyes, but did not answer any of my questions.



Allergies:  NO KNOWN ALLERGIES.



Medications:  According to nursing home list, he is on: Zyprexa 5 mg at bedtime, Abilify 20 mg daily,
 phenobarbital 97.2 mg every 8 hours, metoprolol 12.5 mg twice a day, lisinopril 5 mg at bedtime, friedman
sulosin 0.4 mg p.o. at bedtime, and Dilantin 100 mg p.o. 3 times a day.



Review of Systems:

CNS as mentioned above.  All other systems unable to review because the patient is not able to answer
 any questions.



Social History:  Negative for smoking or alcohol use.



Family History:  Significant for hypertension, diabetes, coronary artery disease.



Past Surgical History:  Right-sided hemicolectomy in February 2015 for colon cancer, hydrocele repair
, appendectomy, skin cancer removal, partial lobectomy of the right lung in 1985 and it was not due t
o cancer.  Cardiac cath done in September 2015 showed normal coronary arteries.



Past Medical History:  Significant for seizure disorder, gastroesophageal reflux disease, hypertensio
n, hyperlipidemia, colon cancer, impaired fasting glucose, diverticulosis, bladder hypertonicity, sean
izophrenia, allergic rhinitis, osteoarthritis at multiple sites.



Physical Examination:

Vital signs: When he came into emergency room temperature 97.7, pulse 68, respiratory rate 16, blood 
pressure 115/83, oxygen saturation 99%.  Height 5 feet 8 inches, weight 220 pounds. 

General: The patient lying in bed not in distress, not answering any questions or following any comma
nds and not in respiratory distress. 

HEENT:  Head atraumatic, normocephalic.  Conjunctivae nonerythematous.  Sclerae white.  Mouth, no thr
ush or edema noted.  Ears/Nose, no mass, lesion, discharge noted. 

Neck:  Supple. No JVD, lymph nodes, bruit, thyromegaly noted. 

Lungs:  Bilateral good equal air entry. Clear to auscultation. No rhonchi.  No rales. 

Heart:  Normal heart sounds, no murmur or gallop. 

Abdomen:  Soft, bowel sounds normal. No guarding, rigidity, tenderness, mass, hepatosplenomegaly, dis
tention, or bruit noted. 

Extremities:  No leg edema.  No calf tenderness. 

Skin:  No rash, ulcer, cellulitis. 

Lymphatics:  No lymph node enlargement in neck, supraclavicular, infraclavicular region. 

Neuro:  No focal neurological deficit. 

Chest:  Unremarkable. 

External Genitalia:  Deferred. 

Rectal:  Deferred. 

CNS: The patient has altered mental status.  Otherwise a detailed CNS exam is not possible.



Laboratory Data:  Chest x-ray:  No acute cardiopulmonary changes.  Chest x-ray, it was a shallow insp
iration film. 



Electrocardiogram:  Normal sinus rhythm.  No acute ST-T changes noted.  



Labs:  White count 5.4, hemoglobin 12.9, platelets 195.  Sodium 143, potassium 3.5, chloride 106, bic
arb 33, BUN 27, creatinine 0.9, glucose 109.  



Liver function tests unremarkable.  Lipase 47, procalcitonin less than 0.05.  



Urinalysis negative for nitrite and esterase.  WBC less than 5, bacteria less than 20.  



Dilantin level 13.7, phenobarbital level 76.5.



Impression:  

1.Phenobarbital toxicity.

2.Altered mental status secondary to above.

3.Seizure disorder.

4.Schizophrenia.

5.Anemia, unspecified.

6.Hypertension.

7.Gastroesophageal reflux disease.

8.Hyperlipidemia.

9.Colon cancer.

10.Impaired fasting glucose.

11.Diverticulosis.

12.Bladder hypertonicity.

13.Osteoarthritis multiple sites.

14.Allergic rhinitis.



Plan:  Admit the patient to hospital for further evaluation of this problem.  The patient is appropri
ate for inpatient and is expected to spend 2 midnights in hospital.  We will go ahead and give him IV
 fluid.  I will re-evaluate him tomorrow morning.  We will repeat blood work tomorrow morning to foll
ow up on phenobarbital level, and hopefully by tomorrow, he should wake up enough to get him started 
on 

some diet.  We will consult Physical Therapy to help ambulate him starting tomorrow.  His home medica
tions will be continued per order.





ZAIRE/MERE

DD:  01/04/2019 20:36:46Voice ID:  [id number]

## 2019-01-05 NOTE — PN
Date of Progress Note:  01/04/2019



Subjective:  The patient was seen this morning for followup.  His family members were present with margie harper at bedside, who are the patient's nephew as well as his wife.  The patient was a lot more better to
day.  He was awake, alert, recognizing me, answering all the questions very appropriately.  He is sti
ll not completely back to his normal self but significantly better today than yesterday.  He was star
jeanette on diet this morning and is tolerating that very well.



Objective:  Vital Signs:  Reviewed. 

HEENT:  Unremarkable. 

Lungs:  Clear to auscultation. 

Heart:  Sounds normal. 

Abdomen:  Soft.  Bowel sounds normal.  No guarding, rigidity, tenderness, or distention. 

Extremities:  No leg edema.



Laboratory Data:  Sodium 146, potassium 3.3, chloride 110, bicarb 28, BUN 23, creatinine 0.69, glucos
e 94, phenobarbital level 63.3.



Impression:  

1.Phenobarbital toxicity.

2.Hypokalemia.

3.Anemia.

4.Seizure disorder.

5.Hypertension.

6.Mixed hyperlipidemia.

7.Gastroesophageal reflux disease.

8.Impaired fasting glucose.

9.Diverticulosis.

10.Colon cancer.



Plan:  We will go ahead and continue home medications per order.  We will not give any phenobarbital 
at this point.  Upon review of office record, his phenobarbital dose listed in the office record was 
30 mg 3 times a day.  Phenobarbital was not prescribed by me but it appears that at some point his do
se was increased to the current dose and very likely that probably happened in [place] and that has r
esulted in phenobarbital toxicity.  So, obviously we will not be giving him phenobarbital at this poi
nt.  At appropriate time, we will restart phenobarbital at a lower dose and [facility] this level should be closely monitored by nursing home physician.  The patien
t was advised to eat all his 3 meals regularly while he is here as well as at the nursing home as he 
has not been eating as well and he was advised to do some physical therapy to regain his strength.  P
zinar to his admission to psychiatric hospital, the patient was living at independent facility and he 
was ambulating on his own and I expect him to get back to that previous level of functioning and I do
 not see any reason why he should not be able to achieve that goal.  All these details were discussed
 with him today.  I will see him tomorrow for followup.





ZAIRE/MODL

DD:  01/04/2019 20:40:26Voice ID:  [id number]

DT:  01/05/2019 00:43:08Report ID:  [id number]

## 2019-01-06 LAB
ALBUMIN SERPL BCP-MCNC: 2.5 G/DL (ref 3.4–5)
ALP SERPL-CCNC: 61 U/L (ref 45–117)
ALT SERPL W P-5'-P-CCNC: 12 U/L (ref 12–78)
AST SERPL W P-5'-P-CCNC: 15 U/L (ref 15–37)
BUN BLD-MCNC: 11 MG/DL (ref 7–18)
GLUCOSE SERPLBLD-MCNC: 101 MG/DL (ref 74–106)
HCT VFR BLD CALC: 33.5 % (ref 39.6–49)
LYMPHOCYTES # SPEC AUTO: 1 K/UL (ref 0.7–4.9)
MAGNESIUM SERPL-MCNC: 1.8 MG/DL (ref 1.8–2.4)
PMV BLD: 8.7 FL (ref 7.6–11.3)
POTASSIUM SERPL-SCNC: 3.5 MMOL/L (ref 3.5–5.1)
RBC # BLD: 3.48 M/UL (ref 4.33–5.43)

## 2019-01-06 RX ADMIN — DEXTROSE AND SODIUM CHLORIDE SCH: 5; .9 INJECTION, SOLUTION INTRAVENOUS at 00:27

## 2019-01-06 RX ADMIN — PHENYTOIN SODIUM SCH MG: 100 CAPSULE, EXTENDED RELEASE ORAL at 09:18

## 2019-01-06 RX ADMIN — METOPROLOL TARTRATE SCH MG: 25 TABLET ORAL at 09:17

## 2019-01-06 RX ADMIN — METOPROLOL TARTRATE SCH MG: 25 TABLET ORAL at 20:37

## 2019-01-06 RX ADMIN — Medication SCH: at 09:00

## 2019-01-06 RX ADMIN — DEXTROSE AND SODIUM CHLORIDE SCH MLS: 5; .9 INJECTION, SOLUTION INTRAVENOUS at 15:40

## 2019-01-06 RX ADMIN — PHENYTOIN SODIUM SCH MG: 100 CAPSULE, EXTENDED RELEASE ORAL at 14:00

## 2019-01-06 RX ADMIN — PHENYTOIN SODIUM SCH MG: 100 CAPSULE, EXTENDED RELEASE ORAL at 20:36

## 2019-01-06 RX ADMIN — TAMSULOSIN HYDROCHLORIDE SCH MG: 0.4 CAPSULE ORAL at 20:37

## 2019-01-06 RX ADMIN — Medication SCH: at 20:38

## 2019-01-06 NOTE — PN
Date of Progress Note:  01/06/2019



Subjective:  The patient was seen this morning for followup.  No new complaints or problems reported 
by the patient who was lying in bed, not in distress.  Early this morning, nurse contacted to inform 
me that the patient had not voided all night and had about 410 cc of urine on the bladder scan.  An o
rder was given for nurse to put the Morales catheter back in, and then she contacted me again to inform
 that she had tried to put the catheter in twice, but there was no urine output.  At that time, she w
as advised that very likely the catheter was not in the place and she should contact house supervisor
 for catheter placement.  When I came to see him, he had about 400 to 500 cc of urine present in the 
Morales catheter bag.  When I asked the nurse today to see if the nighttime nurse had tried to get the 
patient out of bed to use the bedside commode for urination before contacting me, and she was not inf
ormed that any such measures were done before contacting me, so I have advised the daytime nurse toda
y to go ahead and remove the catheter and to make sure to get the patient up for urination.  Physical
 Therapy did not work with him yesterday or today as the patient reports, but the patient reports francisca
t they worked with him the day before yesterday.



Objective:  Vital Signs:  Reviewed. 

HEENT:  Unremarkable 

Lungs:  Clear to auscultation. 

Heart:  Heart sounds normal. 

Abdomen:  Soft.  Bowel sounds normal.  No guarding, rigidity, tenderness, or distention. 

Extremities:  No leg edema.



Laboratory Data:  White count 4.2, hemoglobin 11.5, platelets 201.  Sodium 146, potassium 3.5, chlori
de 111, bicarb 26, BUN 11, creatinine 0.72, glucose 101.  Liver function tests unremarkable.  Phenoba
rbital 58.3, yesterday it was 66.6.



Impression:  

1.Phenobarbital toxicity.

2.Benign prostatic hypertrophy with urinary retention.

3.Seizure disorder.

4.Schizophrenia.



Plan:  We will go ahead and remove the Morales catheter.  Nursing staff to get the patient up for urina
tion.  Continue Flomax.  Continue to hold phenobarbital.  We will follow up on blood work tomorrow.  
Possible discharge to go back to nursing home tomorrow.  Continue other current medical management.





ZAIRE/MODL

DD:  01/06/2019 13:02:23Voice ID:  [id number]

DT:  01/06/2019 18:15:04Report ID:  [id number]

## 2019-01-07 RX ADMIN — PHENYTOIN SODIUM SCH MG: 100 CAPSULE, EXTENDED RELEASE ORAL at 14:22

## 2019-01-07 RX ADMIN — METOPROLOL TARTRATE SCH MG: 25 TABLET ORAL at 10:04

## 2019-01-07 RX ADMIN — PHENYTOIN SODIUM SCH MG: 100 CAPSULE, EXTENDED RELEASE ORAL at 21:00

## 2019-01-07 RX ADMIN — Medication SCH ML: at 10:01

## 2019-01-07 RX ADMIN — DEXTROSE AND SODIUM CHLORIDE SCH MLS: 5; .9 INJECTION, SOLUTION INTRAVENOUS at 17:18

## 2019-01-07 RX ADMIN — DEXTROSE AND SODIUM CHLORIDE SCH MLS: 5; .9 INJECTION, SOLUTION INTRAVENOUS at 03:49

## 2019-01-07 RX ADMIN — TAMSULOSIN HYDROCHLORIDE SCH MG: 0.4 CAPSULE ORAL at 21:00

## 2019-01-07 RX ADMIN — METOPROLOL TARTRATE SCH MG: 25 TABLET ORAL at 21:00

## 2019-01-07 RX ADMIN — Medication SCH ML: at 22:59

## 2019-01-07 RX ADMIN — PHENYTOIN SODIUM SCH MG: 100 CAPSULE, EXTENDED RELEASE ORAL at 10:01

## 2019-01-07 RX ADMIN — ACETAMINOPHEN PRN MG: 500 TABLET, FILM COATED ORAL at 23:30

## 2019-01-07 NOTE — RAD REPORT
EXAM DESCRIPTION:  CT - Head Brain Wo Cont - 1/7/2019 9:11 am

 

CLINICAL HISTORY:  Alteration of awareness/confusion

 

COMPARISON:  2008

 

TECHNIQUE:  Computed axial tomography of the head was obtained. IV contrast was not requested.

 

All CT scans are performed using dose optimization technique as appropriate and may include automated
 exposure control or mA/KV adjustment according to patient size.

 

FINDINGS:  A 9 centimeter low-density area within the right cerebrum is unchanged having the appearan
ce of cystic encephalomalacia.

 

An intracranial  bleed is not seen .

 

The ventricles are normal in caliber.

 

No extra-axial fluid collection is noted.

 

Fluid within the sinuses/ mastoids is not seen. Chronic ethmoid sinusitis

 

IMPRESSION:  No acute intracranial abnormality is seen. If patient's symptoms persist  MRI of the bra
in would be recommended.

## 2019-01-08 LAB
ALBUMIN SERPL BCP-MCNC: 2.8 G/DL (ref 3.4–5)
ALP SERPL-CCNC: 73 U/L (ref 45–117)
ALT SERPL W P-5'-P-CCNC: 16 U/L (ref 12–78)
AST SERPL W P-5'-P-CCNC: 14 U/L (ref 15–37)
BUN BLD-MCNC: 8 MG/DL (ref 7–18)
GLUCOSE SERPLBLD-MCNC: 96 MG/DL (ref 74–106)
HCT VFR BLD CALC: 35.8 % (ref 39.6–49)
LYMPHOCYTES # SPEC AUTO: 0.9 K/UL (ref 0.7–4.9)
MAGNESIUM SERPL-MCNC: 1.8 MG/DL (ref 1.8–2.4)
PMV BLD: 8.2 FL (ref 7.6–11.3)
POTASSIUM SERPL-SCNC: 3.4 MMOL/L (ref 3.5–5.1)
RBC # BLD: 3.69 M/UL (ref 4.33–5.43)

## 2019-01-08 RX ADMIN — DEXTROSE AND SODIUM CHLORIDE SCH: 5; .9 INJECTION, SOLUTION INTRAVENOUS at 23:45

## 2019-01-08 RX ADMIN — METOPROLOL TARTRATE SCH MG: 25 TABLET ORAL at 09:17

## 2019-01-08 RX ADMIN — PHENYTOIN SODIUM SCH MG: 100 CAPSULE, EXTENDED RELEASE ORAL at 15:01

## 2019-01-08 RX ADMIN — CEFTRIAXONE SCH MLS: 1 INJECTION, SOLUTION INTRAVENOUS at 21:29

## 2019-01-08 RX ADMIN — PHENYTOIN SODIUM SCH MG: 100 CAPSULE, EXTENDED RELEASE ORAL at 09:18

## 2019-01-08 RX ADMIN — CEFTRIAXONE SCH MLS: 1 INJECTION, SOLUTION INTRAVENOUS at 09:16

## 2019-01-08 RX ADMIN — TAMSULOSIN HYDROCHLORIDE SCH MG: 0.4 CAPSULE ORAL at 21:31

## 2019-01-08 RX ADMIN — DEXTROSE AND SODIUM CHLORIDE SCH MLS: 5; .9 INJECTION, SOLUTION INTRAVENOUS at 21:28

## 2019-01-08 RX ADMIN — DEXTROSE AND SODIUM CHLORIDE SCH MLS: 5; .9 INJECTION, SOLUTION INTRAVENOUS at 09:18

## 2019-01-08 RX ADMIN — METOPROLOL TARTRATE SCH MG: 25 TABLET ORAL at 21:31

## 2019-01-08 RX ADMIN — Medication SCH ML: at 09:18

## 2019-01-08 RX ADMIN — DEXTROSE AND SODIUM CHLORIDE SCH: 5; .9 INJECTION, SOLUTION INTRAVENOUS at 07:40

## 2019-01-08 RX ADMIN — Medication SCH ML: at 21:29

## 2019-01-08 RX ADMIN — PHENYTOIN SODIUM SCH MG: 100 CAPSULE, EXTENDED RELEASE ORAL at 21:31

## 2019-01-08 RX ADMIN — ACETAMINOPHEN PRN MG: 500 TABLET, FILM COATED ORAL at 21:31

## 2019-01-08 NOTE — RAD REPORT
EXAM DESCRIPTION:  Darinel Single View1/8/2019 7:52 am

 

CLINICAL HISTORY:  Fever

 

COMPARISON:  January 3rd

 

FINDINGS:   Chronic elevation right hemidiaphragm

 

The lungs appear clear of acute infiltrate. The heart is normal size

 

IMPRESSION:   No acute abnormalities displayed

## 2019-01-08 NOTE — PN
Date of Progress Note:  01/07/2019



Subjective:  The patient was seen this morning for followup.  Lying in bed, not in any distress.  He 
appears little sleepier than normal but overall improving on a day-to-day basis.  No new complaints, 
problems reported. No trouble emptying bladder.



Objective:  Vital Signs:  Reviewed. 

HEENT:  Unremarkable. 

Lungs:  Clear to auscultation. 

Heart:  Sounds normal. 

Abdomen:  Soft.  Bowel sounds normal.  No guarding, rigidity, tenderness, or distention.  Extremities
:  No leg edema.



Laboratory Data:  His phenobarbital level is 53.1.



Impression:  

1.Phenobarbital toxicity.

2.Altered mental status.

3.Hypertension.

4.Seizure disorder.

5.Schizophrenia.



Plan:  We did obtain CAT scan of the head today without contrast.  It was negative for any acute gifford
ges.  Continue current medical management.  Physical Therapy to continue to help ambulate the patient
.  We will repeat blood work tomorrow.  Possible discharge to go to nursing home tomorrow depending o
n his condition.  I will see him tomorrow morning for followup.





ZAIRE/MODL

DD:  01/07/2019 20:22:39Voice ID:  [id number]

DT:  01/08/2019 00:54:38Report ID:  [id number]

## 2019-01-09 LAB
ALBUMIN SERPL BCP-MCNC: 2.3 G/DL (ref 3.4–5)
ALP SERPL-CCNC: 70 U/L (ref 45–117)
ALT SERPL W P-5'-P-CCNC: 18 U/L (ref 12–78)
AST SERPL W P-5'-P-CCNC: 33 U/L (ref 15–37)
BUN BLD-MCNC: 12 MG/DL (ref 7–18)
BUN BLD-MCNC: 14 MG/DL (ref 7–18)
GLUCOSE SERPLBLD-MCNC: 113 MG/DL (ref 74–106)
GLUCOSE SERPLBLD-MCNC: 146 MG/DL (ref 74–106)
HCT VFR BLD CALC: 33 % (ref 39.6–49)
LYMPHOCYTES # SPEC AUTO: 0.3 K/UL (ref 0.7–4.9)
MORPHOLOGY BLD-IMP: (no result)
PMV BLD: 8.2 FL (ref 7.6–11.3)
POTASSIUM SERPL-SCNC: 3.6 MMOL/L (ref 3.5–5.1)
POTASSIUM SERPL-SCNC: 3.6 MMOL/L (ref 3.5–5.1)
RBC # BLD: 3.45 M/UL (ref 4.33–5.43)
UA COMPLETE W REFLEX CULTURE PNL UR: (no result)

## 2019-01-09 PROCEDURE — 02HV33Z INSERTION OF INFUSION DEVICE INTO SUPERIOR VENA CAVA, PERCUTANEOUS APPROACH: ICD-10-PCS

## 2019-01-09 PROCEDURE — B548ZZA ULTRASONOGRAPHY OF SUPERIOR VENA CAVA, GUIDANCE: ICD-10-PCS

## 2019-01-09 RX ADMIN — Medication SCH ML: at 09:18

## 2019-01-09 RX ADMIN — PIPERACILLIN SODIUM AND TAZOBACTAM SODIUM SCH MLS: 3; .375 INJECTION, POWDER, LYOPHILIZED, FOR SOLUTION INTRAVENOUS at 05:20

## 2019-01-09 RX ADMIN — PIPERACILLIN SODIUM AND TAZOBACTAM SODIUM SCH MLS: 3; .375 INJECTION, POWDER, LYOPHILIZED, FOR SOLUTION INTRAVENOUS at 11:09

## 2019-01-09 RX ADMIN — METOPROLOL TARTRATE SCH: 25 TABLET ORAL at 21:00

## 2019-01-09 RX ADMIN — SODIUM CHLORIDE PRN MLS: 0.9 INJECTION, SOLUTION INTRAVENOUS at 20:03

## 2019-01-09 RX ADMIN — DEXTROSE AND SODIUM CHLORIDE SCH MLS: 5; .9 INJECTION, SOLUTION INTRAVENOUS at 10:54

## 2019-01-09 RX ADMIN — SODIUM CHLORIDE SCH MLS: 9 INJECTION, SOLUTION INTRAVENOUS at 18:31

## 2019-01-09 RX ADMIN — PHENYTOIN SODIUM SCH MG: 100 CAPSULE, EXTENDED RELEASE ORAL at 21:20

## 2019-01-09 RX ADMIN — PHENYTOIN SODIUM SCH MG: 100 CAPSULE, EXTENDED RELEASE ORAL at 11:09

## 2019-01-09 RX ADMIN — DEXTROSE AND SODIUM CHLORIDE SCH: 5; .9 INJECTION, SOLUTION INTRAVENOUS at 19:37

## 2019-01-09 RX ADMIN — METOPROLOL TARTRATE SCH: 25 TABLET ORAL at 09:00

## 2019-01-09 RX ADMIN — PIPERACILLIN SODIUM AND TAZOBACTAM SODIUM SCH MLS: 3; .375 INJECTION, POWDER, LYOPHILIZED, FOR SOLUTION INTRAVENOUS at 01:18

## 2019-01-09 RX ADMIN — SODIUM CHLORIDE PRN MLS: 0.9 INJECTION, SOLUTION INTRAVENOUS at 11:49

## 2019-01-09 RX ADMIN — PHENYTOIN SODIUM SCH MG: 100 CAPSULE, EXTENDED RELEASE ORAL at 09:17

## 2019-01-09 RX ADMIN — PIPERACILLIN SODIUM AND TAZOBACTAM SODIUM SCH MLS: 3; .375 INJECTION, POWDER, LYOPHILIZED, FOR SOLUTION INTRAVENOUS at 18:30

## 2019-01-09 RX ADMIN — PHENYTOIN SODIUM SCH MG: 100 CAPSULE, EXTENDED RELEASE ORAL at 14:18

## 2019-01-09 RX ADMIN — Medication SCH ML: at 21:21

## 2019-01-09 RX ADMIN — TAMSULOSIN HYDROCHLORIDE SCH MG: 0.4 CAPSULE ORAL at 21:20

## 2019-01-09 NOTE — RAD REPORT
EXAM DESCRIPTION:  RAD - Chest Single View - 1/9/2019 1:40 pm

 

CLINICAL HISTORY:  PICC line placement

 

COMPARISON:  Chest Single View dated 1/9/2019; Chest Single View dated 1/8/2019; Chest Single View da
jeanette 1/3/2019; Chest Single View dated 6/2/2018

 

FINDINGS:  Portable chest was obtained following placement of a left upper extremity PICC line. The c
atheter tip projects over the SVC..

## 2019-01-09 NOTE — RAD REPORT
EXAM DESCRIPTION:  RAD - Chest Single View - 1/9/2019 9:09 am

 

CLINICAL HISTORY:  Fever, limited range of motion

 

COMPARISON:  January 8, January 3rd

 

TECHNIQUE:  AP portable chest image was obtained 0903 hours .

 

FINDINGS:  Lung volumes are low. There is motion degradation at each lung base. Lung markings at each
 base are fractionally increased suspected to be artifact of motion and shallow inspiration. Prior ri
ght perihilar surgical changes noted. Failure and volume overload are not suspected. Heart and vascul
ature are normal. No pneumothorax seen. Small pleural effusions can be masked. No acute bony abnormal
ity seen. No acute aortic findings suspected.

 

IMPRESSION:  Limited portable study shows no significant cardiopulmonary finding.

 

Patchy bilateral lung base opacification favored to be artifact of motion and shallow inspiration.

## 2019-01-09 NOTE — PN
Date of Progress Note:  01/08/2019



The patient was seen this morning for followup, lying in bed not in distress.  
No new complaints or problems reported by him.  The patient's mental status has 
improved.  He is back to his normal self, awake, alert, oriented and answers 
questions appropriately and reported that yesterday he did ambulate with vital 
signs reviewed.  The patient started having fever last night.  He denies any 
cough, congestion.  No urinary complaints.  No trouble voiding.



Physical Examination:

Vital Signs:  Reviewed. 

HEENT:  Examination unremarkable. 

Lungs:  Clear to auscultation. 

Heart:  Sounds normal. 

Abdomen soft.  Bowel sounds normal.  No guarding, rigidity, tenderness, 
distention. 

Extremities:  No leg edema.



Laboratory Data:  His phenobarbital level from this morning is 50.5.  CBC from 
today shows white count 6.8, hemoglobin 12.1, platelets 198.  Chemistry shows 
potassium 3.4, otherwise rest of electrolytes and liver function tests 
unremarkable.  Procalcitonin less than 0.05.  Urinalysis was negative.  Chest x-
ray:  No signs of pneumonia from today.



Impression:  

1.   Phenobarbital toxicity.

2.   Altered mental status secondary to above, improved.

3.   Fever.

4.   Hypertension.

5.   Seizure disorder.

6.   Schizophrenia.

7.   Hypokalemia.



Plan:  At this point, this morning when I saw him, we did not have any definite 
idea about source of fever and considering he had a Morales catheter placement 
over the weekend, I was concerned about possibility of urinary tract infection, 
so urinalysis and urine culture was done and empiric antibiotic, ceftriaxone 
was started.  Considering his urinalysis and chest x-ray is normal and all 
throughout the day his temperature came down and we will monitor his 
temperature and other vital signs overnight.  If he remains afebrile, our plan 
is to discharge him to go to nursing home tomorrow.  As per request, I did call 
the patient's sister and details were discussed with her.





ZAIRE/MODL

DD:  01/08/2019 21:18:22   Voice ID:  [id number]

DT:  01/09/2019 02:13:41   Report ID:  [id number]

JAYRO

## 2019-01-09 NOTE — RAD REPORT
EXAM DESCRIPTION:  CTAbdomen   Pelvis W Contrast - 1/9/2019 3:04 pm

 

CLINICAL HISTORY:  Abdominal pain.

fever

 

COMPARISON:  Abdomen   Pelvis W Contrast dated 6/19/2018; Abdomen   Pelvis W Contrast dated 6/20/2017
; CT ABD PELVIS W CONTRAST dated 3/22/2016; CT ABD PELVIS W CONTRAST dated 7/28/2015; Chest Single Vi
ew dated 1/9/2019

 

TECHNIQUE:  Biphasic CT imaging of the abdomen and pelvis was performed with 100 ml non-ionic IV cont
rast.

 

All CT scans are performed using dose optimization technique as appropriate and may include automated
 exposure control or mA/KV adjustment according to patient size.

 

FINDINGS:  Postsurgical changes are seen in the right infrahilar region with right posterior pleural 
thickening.

 

Mild fatty liver is identified with 20 millimeter hepatic cyst in the left lobe again seen, unchanged
. No aggressive liver lesion or intrahepatic biliary dilatation is identified. The spleen, adrenal gl
ands and left kidney are normal. The right kidney contains a 5.6 cm benign cyst superiorly. Atrophy t
he pancreatic tail is seen. No pancreatic mass is present.

 

No bowel obstruction, free air, free fluid or abscess. Right hemicolectomy is present. Prominent sigm
oid diverticulosis is noted without diverticulitis.  No evidence of significant lymphadenopathy. No o
mental or mesenteric implants/nodularity seen.

 

Morales catheter is in place. Moderate lumbar degenerative changes.

 

IMPRESSION:  No acute intra-abdominal or pelvic finding.

## 2019-01-10 LAB
ALBUMIN SERPL BCP-MCNC: 2.2 G/DL (ref 3.4–5)
ALP SERPL-CCNC: 62 U/L (ref 45–117)
ALT SERPL W P-5'-P-CCNC: 18 U/L (ref 12–78)
AST SERPL W P-5'-P-CCNC: 25 U/L (ref 15–37)
BUN BLD-MCNC: 11 MG/DL (ref 7–18)
GLUCOSE SERPLBLD-MCNC: 195 MG/DL (ref 74–106)
HCT VFR BLD CALC: 32.6 % (ref 39.6–49)
LYMPHOCYTES # SPEC AUTO: 0.5 K/UL (ref 0.7–4.9)
MAGNESIUM SERPL-MCNC: 1.8 MG/DL (ref 1.8–2.4)
PMV BLD: 8.8 FL (ref 7.6–11.3)
POTASSIUM SERPL-SCNC: 3.6 MMOL/L (ref 3.5–5.1)
RBC # BLD: 3.41 M/UL (ref 4.33–5.43)

## 2019-01-10 RX ADMIN — PIPERACILLIN SODIUM AND TAZOBACTAM SODIUM SCH MLS: 3; .375 INJECTION, POWDER, LYOPHILIZED, FOR SOLUTION INTRAVENOUS at 16:08

## 2019-01-10 RX ADMIN — PIPERACILLIN SODIUM AND TAZOBACTAM SODIUM SCH MLS: 3; .375 INJECTION, POWDER, LYOPHILIZED, FOR SOLUTION INTRAVENOUS at 00:53

## 2019-01-10 RX ADMIN — TAMSULOSIN HYDROCHLORIDE SCH MG: 0.4 CAPSULE ORAL at 20:04

## 2019-01-10 RX ADMIN — Medication SCH ML: at 08:39

## 2019-01-10 RX ADMIN — METOPROLOL TARTRATE SCH: 25 TABLET ORAL at 08:36

## 2019-01-10 RX ADMIN — SODIUM CHLORIDE SCH MLS: 9 INJECTION, SOLUTION INTRAVENOUS at 13:17

## 2019-01-10 RX ADMIN — METOPROLOL TARTRATE SCH MG: 25 TABLET ORAL at 22:19

## 2019-01-10 RX ADMIN — DEXTROSE AND SODIUM CHLORIDE SCH: 5; .9 INJECTION, SOLUTION INTRAVENOUS at 05:45

## 2019-01-10 RX ADMIN — ACETAMINOPHEN PRN MG: 500 TABLET, FILM COATED ORAL at 23:24

## 2019-01-10 RX ADMIN — PHENYTOIN SODIUM SCH MG: 100 CAPSULE, EXTENDED RELEASE ORAL at 20:03

## 2019-01-10 RX ADMIN — PHENYTOIN SODIUM SCH MG: 100 CAPSULE, EXTENDED RELEASE ORAL at 08:36

## 2019-01-10 RX ADMIN — PIPERACILLIN SODIUM AND TAZOBACTAM SODIUM SCH MLS: 3; .375 INJECTION, POWDER, LYOPHILIZED, FOR SOLUTION INTRAVENOUS at 08:38

## 2019-01-10 RX ADMIN — PHENYTOIN SODIUM SCH MG: 100 CAPSULE, EXTENDED RELEASE ORAL at 13:19

## 2019-01-10 RX ADMIN — DEXTROSE AND SODIUM CHLORIDE SCH MLS: 5; .9 INJECTION, SOLUTION INTRAVENOUS at 13:19

## 2019-01-10 RX ADMIN — SODIUM CHLORIDE PRN MLS: 0.9 INJECTION, SOLUTION INTRAVENOUS at 06:13

## 2019-01-10 RX ADMIN — Medication SCH ML: at 20:04

## 2019-01-10 NOTE — PN
Date of Progress Note:  01/09/2019



Subjective:  The patient was seen this morning for followup.  Last night, the patient's temperature w
ent up to 104.2 degrees Fahrenheit and Tylenol and Motrin was ordered.  IV fluid was ordered at that 
time.  Blood culture x2 was ordered and his antibiotics were changed last night.  His temperature cam
e down by this morning, but then as of this morning he started to have low blood pressure.  His blood
 pressure dropped into range of 60-70 systolic.  IV fluid bolus was started and decision was made to 
transfer him to ICU.  This morning when I saw him before this episode of low blood pressure, he was s
leeping, easily arousable, not in distress.  Denied any specific complaints.



Objective:  Vital Signs:  Reviewed. 

HEENT Examination:  Unremarkable. 

Lungs:  Clear to auscultation. 

Heart:  Sounds normal. 

Abdomen:  Soft.  Bowel sounds normal.  No guarding, rigidity, tenderness, distention. 

Extremity Exam:  No leg edema. 

Skin Examination:  No evidence of any rash anywhere and the patient was examined from head to toe thi
s morning to look for any area of any infection.  There was no evidence of any redness of the skin an
ywhere.



Labs:  Reviewed.



Impression:  

1.Urinary tract infection, rule out sepsis.

2.Phenobarbital toxicity.

3.Seizure disorder.

4.Schizophrenia.



Plan:  Once the patient's blood pressure started to drop low in range of 60-70 systolic, we started h
im on IV fluid boluses and subsequently he was moved to ICU.  We continued IV fluid boluses in the IC
U, total of 2 L of IV fluid was given and he was started on vasopressor medication, Levophed.  He has
 adequate amount of urine output, approximately 1000 cc of urine output was noted after Morales cathete
r was placed around the time of transfer to ICU till right now.  He is maintaining good adequate oxyg
en saturation.  Chest x-ray shows shallow inspiration.  CAT scan of the abdomen came back unremarkabl
e.  Urinalysis is abnormal consistent with urinary tract infection.  So at this point, we are concern
ed about urinary tract infection and possibility of sepsis.  His procalcitonin level today was 22.  L
ast night when his temperature went up to 104, we discontinued ceftriaxone which was started yesterda
y morning and as of last night, we started him on Zosyn and vancomycin.  We will continue this antibi
otics, followup on urine culture results, and then decide if we need to change antibiotics or not dep
ending on the culture results.  This evening, I went back to see him in ICU.  His systolic blood pres
sure was 114.  He was awake, alert, oriented, not in distress, back to his normal usual self.  On phy
sical exam, his lungs were clear.  Abdomen was soft.  We will continue current antibiotics.  We will 
try to wean off vasopressor medication overnight if possible.  I will see him tomorrow morning for yasmin david.





ZAIRE/MODL

DD:  01/09/2019 19:50:31Voice ID:  [id number]

DT:  01/10/2019 01:01:42Report ID:  [id number]

## 2019-01-11 LAB
BUN BLD-MCNC: 8 MG/DL (ref 7–18)
GLUCOSE SERPLBLD-MCNC: 91 MG/DL (ref 74–106)
HCT VFR BLD CALC: 30.9 % (ref 39.6–49)
LYMPHOCYTES # SPEC AUTO: 0.7 K/UL (ref 0.7–4.9)
MAGNESIUM SERPL-MCNC: 1.6 MG/DL (ref 1.8–2.4)
PMV BLD: 8.8 FL (ref 7.6–11.3)
POTASSIUM SERPL-SCNC: 3.1 MMOL/L (ref 3.5–5.1)
RBC # BLD: 3.25 M/UL (ref 4.33–5.43)

## 2019-01-11 RX ADMIN — DEXTROSE AND SODIUM CHLORIDE SCH MLS: 5; .9 INJECTION, SOLUTION INTRAVENOUS at 06:24

## 2019-01-11 RX ADMIN — PHENOBARBITAL SCH MG: 32.4 TABLET ORAL at 20:39

## 2019-01-11 RX ADMIN — METOPROLOL TARTRATE SCH MG: 25 TABLET ORAL at 08:34

## 2019-01-11 RX ADMIN — PHENOBARBITAL SCH MG: 32.4 TABLET ORAL at 08:35

## 2019-01-11 RX ADMIN — PHENYTOIN SODIUM SCH MG: 100 CAPSULE, EXTENDED RELEASE ORAL at 14:12

## 2019-01-11 RX ADMIN — PHENYTOIN SODIUM SCH MG: 100 CAPSULE, EXTENDED RELEASE ORAL at 20:39

## 2019-01-11 RX ADMIN — Medication SCH: at 20:44

## 2019-01-11 RX ADMIN — PIPERACILLIN SODIUM AND TAZOBACTAM SODIUM SCH MLS: 3; .375 INJECTION, POWDER, LYOPHILIZED, FOR SOLUTION INTRAVENOUS at 00:22

## 2019-01-11 RX ADMIN — DEXTROSE AND SODIUM CHLORIDE SCH MLS: 5; .9 INJECTION, SOLUTION INTRAVENOUS at 16:48

## 2019-01-11 RX ADMIN — DEXTROSE AND SODIUM CHLORIDE SCH MLS: 5; .9 INJECTION, SOLUTION INTRAVENOUS at 21:16

## 2019-01-11 RX ADMIN — PIPERACILLIN SODIUM AND TAZOBACTAM SODIUM SCH MLS: 3; .375 INJECTION, POWDER, LYOPHILIZED, FOR SOLUTION INTRAVENOUS at 08:35

## 2019-01-11 RX ADMIN — TAMSULOSIN HYDROCHLORIDE SCH MG: 0.4 CAPSULE ORAL at 20:39

## 2019-01-11 RX ADMIN — PHENOBARBITAL SCH MG: 32.4 TABLET ORAL at 14:12

## 2019-01-11 RX ADMIN — SODIUM CHLORIDE SCH MLS: 9 INJECTION, SOLUTION INTRAVENOUS at 06:28

## 2019-01-11 RX ADMIN — PIPERACILLIN SODIUM AND TAZOBACTAM SODIUM SCH MLS: 3; .375 INJECTION, POWDER, LYOPHILIZED, FOR SOLUTION INTRAVENOUS at 16:37

## 2019-01-11 RX ADMIN — Medication SCH: at 08:44

## 2019-01-11 RX ADMIN — DEXTROSE AND SODIUM CHLORIDE SCH: 5; .9 INJECTION, SOLUTION INTRAVENOUS at 11:45

## 2019-01-11 RX ADMIN — METOPROLOL TARTRATE SCH MG: 25 TABLET ORAL at 20:39

## 2019-01-11 RX ADMIN — SODIUM CHLORIDE SCH MLS: 9 INJECTION, SOLUTION INTRAVENOUS at 23:50

## 2019-01-11 RX ADMIN — PHENYTOIN SODIUM SCH MG: 100 CAPSULE, EXTENDED RELEASE ORAL at 08:35

## 2019-01-11 NOTE — PN
Date of Progress Note:  01/10/2019



Subjective:  The patient was seen this morning for followup.  He was lying in bed in ICU, not in dist
ress.  Awake, alert, and oriented.  Intake, output records reviewed.  Denies any complaints this morn
ing.



Objective:  Vital Signs:  Reviewed. 

HEENT:  Unremarkable. 

Lungs:  Clear to auscultation. 

Heart:  Sounds normal. 

Abdomen:  Soft.  Bowel sounds normal.  No guarding, rigidity, tenderness, or distention. 

Extremities:  No leg edema.



Laboratory Data:  Reviewed.



Impression:  

1.Urinary tract infection.

2.Rule out sepsis.

3.Phenobarbital toxicity.

4.Seizure disorder.

5.Schizophrenia.



Plan:  The patient's WBC count is normal.  Procalcitonin is much better today than yesterday.  Cultur
e results pending.  We will continue current empiric antibiotics.  Follow up on culture results.  Onc
e the final report is available, then we will decide final choice of antibiotic and duration of antib
iotic at that time.  The patient was on vasopressor medication this morning, which we were able to di
scontinue during the course of day today.  We will keep him in ICU 

today and plan is to transfer him out of ICU to regular room tomorrow.  I will see him tomorrow for olivia velez.





ZAIRE/MODL

DD:  01/10/2019 20:20:47Voice ID:  [id number]

DT:  01/11/2019 01:11:27Report ID:  [id number]

## 2019-01-12 LAB
BUN BLD-MCNC: 4 MG/DL (ref 7–18)
GLUCOSE SERPLBLD-MCNC: 118 MG/DL (ref 74–106)
HCT VFR BLD CALC: 32.1 % (ref 39.6–49)
LYMPHOCYTES # SPEC AUTO: 0.9 K/UL (ref 0.7–4.9)
MAGNESIUM SERPL-MCNC: 1.9 MG/DL (ref 1.8–2.4)
PMV BLD: 8.4 FL (ref 7.6–11.3)
POTASSIUM SERPL-SCNC: 3.4 MMOL/L (ref 3.5–5.1)
RBC # BLD: 3.38 M/UL (ref 4.33–5.43)

## 2019-01-12 RX ADMIN — PHENYTOIN SODIUM SCH MG: 100 CAPSULE, EXTENDED RELEASE ORAL at 13:23

## 2019-01-12 RX ADMIN — SODIUM CHLORIDE SCH MLS: 9 INJECTION, SOLUTION INTRAVENOUS at 19:08

## 2019-01-12 RX ADMIN — PHENYTOIN SODIUM SCH MG: 100 CAPSULE, EXTENDED RELEASE ORAL at 20:57

## 2019-01-12 RX ADMIN — PHENOBARBITAL SCH MG: 32.4 TABLET ORAL at 08:56

## 2019-01-12 RX ADMIN — METOPROLOL TARTRATE SCH MG: 25 TABLET ORAL at 20:56

## 2019-01-12 RX ADMIN — Medication SCH: at 20:58

## 2019-01-12 RX ADMIN — PHENYTOIN SODIUM SCH MG: 100 CAPSULE, EXTENDED RELEASE ORAL at 08:56

## 2019-01-12 RX ADMIN — PHENOBARBITAL SCH MG: 32.4 TABLET ORAL at 20:57

## 2019-01-12 RX ADMIN — TAMSULOSIN HYDROCHLORIDE SCH MG: 0.4 CAPSULE ORAL at 20:56

## 2019-01-12 RX ADMIN — METOPROLOL TARTRATE SCH MG: 25 TABLET ORAL at 08:56

## 2019-01-12 RX ADMIN — PHENOBARBITAL SCH MG: 32.4 TABLET ORAL at 13:23

## 2019-01-12 RX ADMIN — PIPERACILLIN SODIUM AND TAZOBACTAM SODIUM SCH MLS: 3; .375 INJECTION, POWDER, LYOPHILIZED, FOR SOLUTION INTRAVENOUS at 01:00

## 2019-01-12 RX ADMIN — PIPERACILLIN SODIUM AND TAZOBACTAM SODIUM SCH MLS: 3; .375 INJECTION, POWDER, LYOPHILIZED, FOR SOLUTION INTRAVENOUS at 08:53

## 2019-01-12 RX ADMIN — DEXTROSE AND SODIUM CHLORIDE SCH: 5; .9 INJECTION, SOLUTION INTRAVENOUS at 07:38

## 2019-01-12 RX ADMIN — PIPERACILLIN SODIUM AND TAZOBACTAM SODIUM SCH MLS: 3; .375 INJECTION, POWDER, LYOPHILIZED, FOR SOLUTION INTRAVENOUS at 16:49

## 2019-01-12 RX ADMIN — LISINOPRIL SCH MG: 5 TABLET ORAL at 20:57

## 2019-01-12 RX ADMIN — Medication SCH ML: at 08:57

## 2019-01-12 NOTE — PN
Date of Progress Note:  01/11/2019



Subjective:  The patient was seen for followup this morning.  He was in ICU, 
not in any distress.  No new complaints or problems reported.  Hemodynamically, 
he is stable.  He has not received any vasopressor medication in over 24 hours.



Objective:  Vital Signs:  Reviewed. 

HEENT:  Unremarkable. 

Lungs:  Clear to auscultation. 

Heart:  Sounds normal. 

Abdomen:  Soft.  Bowel sounds normal.  No guarding, rigidity, tenderness, 
distention. 

Extremities:  No leg edema.



Laboratory Data:  White count 5.5, hemoglobin 10.5, platelets 145.  Sodium 145, 
potassium 3.1, chloride 111, bicarb 29, BUN 8, creatinine 0.80, glucose 91, 
magnesium 1.6.



Impression:  

1.   Urinary tract infection.

2.   Rule out sepsis.

3.   Hypokalemia.

4.   Hypomagnesemia.

5.   Anemia.

6.   Seizure disorder.

7.   Schizophrenia.



Plan:  We will continue current antibiotics.  The patient's condition is stable 
for transfer.  We will transfer out of ICU to regular room.  See copy of 
transfer order for details.  Continue antibiotic, IV fluid.  Physical Therapy 
to continue to work with the patient.  I will see him tomorrow for followup.  
The patient's phenobarbital toxicity problem has resolved.  Yesterday, his 
phenobarbital level was 35.4, 

and we have started him on phenobarbital 32 mg 3 times a day.  I will see him 
tomorrow for followup.





ZAIRE/MODL

DD:  01/12/2019 12:16:55   Voice ID:  [id number]

DT:  01/12/2019 16:16:24   Report ID:  [id number]

MTDD

## 2019-01-12 NOTE — PN
Date of Progress Note:  01/12/2019



Subjective:  The patient was seen this morning for followup.  He was on the medical floor, lying in b
ed, sleeping, easily arousable.  Denied any complaints.



Objective:  Vital Signs:  Reviewed. 

HEENT:  Unremarkable. 

Lungs:  Clear to auscultation. 

Heart:  Sounds normal. 

Abdomen:  Soft.  Bowel sounds normal.  No guarding, rigidity, tenderness, or distention. 

Extremities:  No leg edema.



Laboratory Data:  White count 6.1, hemoglobin 11, platelets 182.  Sodium 146, potassium 3.4, chloride
 112, bicarb 30, BUN 4, creatinine 0.77, glucose 118, magnesium 1.9.



Impression:  

1.Urinary tract infection.

2.Rule out sepsis.

3.Hypokalemia.

4.Seizure disorder.

5.Schizophrenia.

6.Anemia.



Plan:  We will go ahead and continue current antibiotic, which is Zosyn and vancomycin.  So far, cult
ures are negative.  We will discontinue IV fluid as his blood pressure has been stable.  He is tolera
ting diet very well.  We will remove Morales catheter.  Physical therapy to continue to work with the p
atient.  We will see him tomorrow for followup.  Possible discharge to go to nursing home sometime ne
xt week.  The patient reported today that upon discharge he would like to go to [facility]
 instead of going back to [facility].  His family has been talking to me about that 

since he came into the hospital and I have advised family members to communicate with Social Service 
to assist with such arrangements.





ZAIRE/MODL

DD:  01/12/2019 12:20:41Voice ID:  [id number]

DT:  01/12/2019 16:11:41Report ID:  [id number]

## 2019-01-13 LAB
BUN BLD-MCNC: 4 MG/DL (ref 7–18)
GLUCOSE SERPLBLD-MCNC: 120 MG/DL (ref 74–106)
MAGNESIUM SERPL-MCNC: 2 MG/DL (ref 1.8–2.4)
POTASSIUM SERPL-SCNC: 3.5 MMOL/L (ref 3.5–5.1)

## 2019-01-13 RX ADMIN — POTASSIUM BICARBONATE ONE MEQ: 25 TABLET, EFFERVESCENT ORAL at 13:16

## 2019-01-13 RX ADMIN — LISINOPRIL SCH MG: 5 TABLET ORAL at 21:08

## 2019-01-13 RX ADMIN — Medication SCH ML: at 21:10

## 2019-01-13 RX ADMIN — PHENOBARBITAL SCH MG: 32.4 TABLET ORAL at 08:15

## 2019-01-13 RX ADMIN — PHENYTOIN SODIUM SCH MG: 100 CAPSULE, EXTENDED RELEASE ORAL at 08:15

## 2019-01-13 RX ADMIN — PIPERACILLIN SODIUM AND TAZOBACTAM SODIUM SCH MLS: 3; .375 INJECTION, POWDER, LYOPHILIZED, FOR SOLUTION INTRAVENOUS at 00:00

## 2019-01-13 RX ADMIN — PHENOBARBITAL SCH MG: 32.4 TABLET ORAL at 21:09

## 2019-01-13 RX ADMIN — PHENOBARBITAL SCH MG: 32.4 TABLET ORAL at 14:31

## 2019-01-13 RX ADMIN — TAMSULOSIN HYDROCHLORIDE SCH MG: 0.4 CAPSULE ORAL at 21:09

## 2019-01-13 RX ADMIN — METOPROLOL TARTRATE SCH MG: 25 TABLET ORAL at 08:15

## 2019-01-13 RX ADMIN — Medication SCH ML: at 08:17

## 2019-01-13 RX ADMIN — PHENYTOIN SODIUM SCH MG: 100 CAPSULE, EXTENDED RELEASE ORAL at 21:09

## 2019-01-13 RX ADMIN — POTASSIUM BICARBONATE ONE: 25 TABLET, EFFERVESCENT ORAL at 12:00

## 2019-01-13 RX ADMIN — SODIUM CHLORIDE SCH MLS: 9 INJECTION, SOLUTION INTRAVENOUS at 13:18

## 2019-01-13 RX ADMIN — PIPERACILLIN SODIUM AND TAZOBACTAM SODIUM SCH MLS: 3; .375 INJECTION, POWDER, LYOPHILIZED, FOR SOLUTION INTRAVENOUS at 17:53

## 2019-01-13 RX ADMIN — PHENYTOIN SODIUM SCH MG: 100 CAPSULE, EXTENDED RELEASE ORAL at 14:31

## 2019-01-13 RX ADMIN — METOPROLOL TARTRATE SCH MG: 25 TABLET ORAL at 21:05

## 2019-01-13 RX ADMIN — PIPERACILLIN SODIUM AND TAZOBACTAM SODIUM SCH MLS: 3; .375 INJECTION, POWDER, LYOPHILIZED, FOR SOLUTION INTRAVENOUS at 08:14

## 2019-01-13 NOTE — PN
Date of Progress Note:  01/13/2019



Subjective:  The patient was seen this morning for followup.  No new complaints or problems reported.
  Sleeping, easily arousable, not in any distress.  He reports having bowel movement day before yeste
rday.  Denies any abdominal pain, nausea, or vomiting.



Objective:  Vital Signs:  Reviewed. 

HEENT:  Unremarkable. 

Lungs:  Clear to auscultation. 

Heart:  Heart sounds normal. 

Abdomen:  Soft.  Bowel sounds normal.  No guarding, rigidity, tenderness, or distention. 

Extremities:  No leg edema.



Laboratory Data:  Sodium 143, potassium 3.5, chloride 109, bicarb 28, BUN 4, creatinine 0.81, glucose
 120, magnesium 2.  Phenobarbital level of 33.7.



Impression:  

1.Urinary tract infection.

2.Rule out sepsis.

3.Phenobarbital toxicity.

4.Seizure disorder.

5.Schizophrenia.

6.Hypertension.



Plan:  We will continue current antihypertensive medication.  Continue current empiric antibiotics.  
So far, the patient's cultures are negative.  He is hemodynamically stable.  Plan is to continue curr
ent IV antibiotics, which is Zosyn and vancomycin while he is here at the hospital, and hopefully by 
Tuesday of this week, we should be able to discharge him to go back to nursing home, and he will not 
need any IV antibiotics upon discharge.  The patient came to hospital from [facility] the patient and his family have expressed desire to go to [facility] upon discharge if p
ossible, so we will request consultation from Social Service to assist with that.  

I will see him tomorrow for followup.  He is tolerating diet well.  No need for IV fluid.





ZAIRE/MODL

DD:  01/13/2019 11:07:29Voice ID:  [id number]

DT:  01/13/2019 15:53:47Report ID:  [id number]

## 2019-01-14 LAB
BUN BLD-MCNC: 7 MG/DL (ref 7–18)
GLUCOSE SERPLBLD-MCNC: 88 MG/DL (ref 74–106)
POTASSIUM SERPL-SCNC: 3.4 MMOL/L (ref 3.5–5.1)

## 2019-01-14 RX ADMIN — PHENOBARBITAL SCH MG: 32.4 TABLET ORAL at 08:51

## 2019-01-14 RX ADMIN — PIPERACILLIN SODIUM AND TAZOBACTAM SODIUM SCH MLS: 3; .375 INJECTION, POWDER, LYOPHILIZED, FOR SOLUTION INTRAVENOUS at 08:51

## 2019-01-14 RX ADMIN — TAMSULOSIN HYDROCHLORIDE SCH MG: 0.4 CAPSULE ORAL at 20:37

## 2019-01-14 RX ADMIN — METOPROLOL TARTRATE SCH MG: 25 TABLET ORAL at 20:38

## 2019-01-14 RX ADMIN — PHENYTOIN SODIUM SCH MG: 100 CAPSULE, EXTENDED RELEASE ORAL at 15:33

## 2019-01-14 RX ADMIN — LISINOPRIL SCH MG: 5 TABLET ORAL at 20:38

## 2019-01-14 RX ADMIN — Medication SCH ML: at 08:53

## 2019-01-14 RX ADMIN — PIPERACILLIN SODIUM AND TAZOBACTAM SODIUM SCH MLS: 3; .375 INJECTION, POWDER, LYOPHILIZED, FOR SOLUTION INTRAVENOUS at 00:35

## 2019-01-14 RX ADMIN — Medication SCH ML: at 20:39

## 2019-01-14 RX ADMIN — METOPROLOL TARTRATE SCH MG: 25 TABLET ORAL at 08:52

## 2019-01-14 RX ADMIN — PHENOBARBITAL SCH MG: 32.4 TABLET ORAL at 20:38

## 2019-01-14 RX ADMIN — SODIUM CHLORIDE SCH MLS: 9 INJECTION, SOLUTION INTRAVENOUS at 06:11

## 2019-01-14 RX ADMIN — PHENOBARBITAL SCH MG: 32.4 TABLET ORAL at 15:33

## 2019-01-14 RX ADMIN — PHENYTOIN SODIUM SCH MG: 100 CAPSULE, EXTENDED RELEASE ORAL at 20:37

## 2019-01-14 RX ADMIN — PIPERACILLIN SODIUM AND TAZOBACTAM SODIUM SCH MLS: 3; .375 INJECTION, POWDER, LYOPHILIZED, FOR SOLUTION INTRAVENOUS at 17:11

## 2019-01-14 RX ADMIN — PHENYTOIN SODIUM SCH MG: 100 CAPSULE, EXTENDED RELEASE ORAL at 08:53

## 2019-01-15 RX ADMIN — PHENOBARBITAL SCH MG: 32.4 TABLET ORAL at 08:37

## 2019-01-15 RX ADMIN — LISINOPRIL SCH MG: 5 TABLET ORAL at 20:50

## 2019-01-15 RX ADMIN — PIPERACILLIN SODIUM AND TAZOBACTAM SODIUM SCH MLS: 3; .375 INJECTION, POWDER, LYOPHILIZED, FOR SOLUTION INTRAVENOUS at 00:04

## 2019-01-15 RX ADMIN — TAMSULOSIN HYDROCHLORIDE SCH MG: 0.4 CAPSULE ORAL at 20:50

## 2019-01-15 RX ADMIN — METOPROLOL TARTRATE SCH MG: 25 TABLET ORAL at 20:50

## 2019-01-15 RX ADMIN — Medication SCH ML: at 08:38

## 2019-01-15 RX ADMIN — PHENYTOIN SODIUM SCH MG: 100 CAPSULE, EXTENDED RELEASE ORAL at 20:50

## 2019-01-15 RX ADMIN — PIPERACILLIN SODIUM AND TAZOBACTAM SODIUM SCH MLS: 3; .375 INJECTION, POWDER, LYOPHILIZED, FOR SOLUTION INTRAVENOUS at 17:07

## 2019-01-15 RX ADMIN — PHENOBARBITAL SCH MG: 32.4 TABLET ORAL at 20:49

## 2019-01-15 RX ADMIN — PHENOBARBITAL SCH MG: 32.4 TABLET ORAL at 15:26

## 2019-01-15 RX ADMIN — PIPERACILLIN SODIUM AND TAZOBACTAM SODIUM SCH MLS: 3; .375 INJECTION, POWDER, LYOPHILIZED, FOR SOLUTION INTRAVENOUS at 08:37

## 2019-01-15 RX ADMIN — Medication SCH ML: at 20:50

## 2019-01-15 RX ADMIN — PHENYTOIN SODIUM SCH MG: 100 CAPSULE, EXTENDED RELEASE ORAL at 08:37

## 2019-01-15 RX ADMIN — SODIUM CHLORIDE SCH: 9 INJECTION, SOLUTION INTRAVENOUS at 00:06

## 2019-01-15 RX ADMIN — METOPROLOL TARTRATE SCH MG: 25 TABLET ORAL at 08:38

## 2019-01-15 RX ADMIN — PHENYTOIN SODIUM SCH MG: 100 CAPSULE, EXTENDED RELEASE ORAL at 15:26

## 2019-01-15 NOTE — PN
Date of Progress Note:  01/14/2019



Subjective:  The patient was seen this morning for followup.  No new complaints or problems reported 
by the patient lying in bed, not in distress.



Objective:  Vital Signs:  Reviewed. 

HEENT:  Unremarkable. 

Lungs:  Clear to auscultation. 

Heart: Sounds normal. 

Abdomen:  Soft.  Bowel sounds normal.  No guarding, rigidity, tenderness, or distention. 

Extremities:  No leg edema.



Laboratory Data:  Sodium 145, potassium 3.4, chloride 110, bicarb 28, BUN 7, creatinine 0.90, and glu
cose 88.



Impression:  

1.Urinary tract infection.

2.Seizure disorder.

3.Schizophrenia.

4.Hypokalemia.

5.Hypertension.



Plan:  We will continue current medication.  Replace electrolyte per protocol.  We will continue curr
ent antibiotics, social service to assist with discharge planning.  Possible discharge to go to [facility] in next day or 2 days.  Physical therapy to help ambulate the patient.





ZAIRE/MODL

DD:  01/14/2019 20:34:00Voice ID:  [id number]

DT:  01/15/2019 02:01:20Report ID:  [id number]

## 2019-01-16 LAB
BUN BLD-MCNC: 12 MG/DL (ref 7–18)
BUN BLD-MCNC: 13 MG/DL (ref 7–18)
GLUCOSE SERPLBLD-MCNC: 94 MG/DL (ref 74–106)
GLUCOSE SERPLBLD-MCNC: 97 MG/DL (ref 74–106)
HCT VFR BLD CALC: 32.4 % (ref 39.6–49)
LYMPHOCYTES # SPEC AUTO: 1 K/UL (ref 0.7–4.9)
MAGNESIUM SERPL-MCNC: 2.3 MG/DL (ref 1.8–2.4)
PMV BLD: 8.4 FL (ref 7.6–11.3)
POTASSIUM SERPL-SCNC: 3.6 MMOL/L (ref 3.5–5.1)
POTASSIUM SERPL-SCNC: 3.8 MMOL/L (ref 3.5–5.1)
RBC # BLD: 3.39 M/UL (ref 4.33–5.43)
UA COMPLETE W REFLEX CULTURE PNL UR: (no result)

## 2019-01-16 RX ADMIN — METOPROLOL TARTRATE SCH MG: 25 TABLET ORAL at 09:36

## 2019-01-16 RX ADMIN — PHENYTOIN SODIUM SCH MG: 100 CAPSULE, EXTENDED RELEASE ORAL at 21:12

## 2019-01-16 RX ADMIN — PHENYTOIN SODIUM SCH MG: 100 CAPSULE, EXTENDED RELEASE ORAL at 15:27

## 2019-01-16 RX ADMIN — PIPERACILLIN SODIUM AND TAZOBACTAM SODIUM SCH MLS: 3; .375 INJECTION, POWDER, LYOPHILIZED, FOR SOLUTION INTRAVENOUS at 01:01

## 2019-01-16 RX ADMIN — SODIUM CHLORIDE SCH MLS: 0.45 INJECTION, SOLUTION INTRAVENOUS at 15:27

## 2019-01-16 RX ADMIN — PHENOBARBITAL SCH MG: 32.4 TABLET ORAL at 09:37

## 2019-01-16 RX ADMIN — METOPROLOL TARTRATE SCH MG: 25 TABLET ORAL at 21:13

## 2019-01-16 RX ADMIN — PHENYTOIN SODIUM SCH MG: 100 CAPSULE, EXTENDED RELEASE ORAL at 09:37

## 2019-01-16 RX ADMIN — CALCITRIOL CAPSULES 0.25 MCG SCH MCG: 0.25 CAPSULE ORAL at 22:27

## 2019-01-16 RX ADMIN — Medication SCH ML: at 09:00

## 2019-01-16 RX ADMIN — TAMSULOSIN HYDROCHLORIDE SCH MG: 0.4 CAPSULE ORAL at 21:12

## 2019-01-16 RX ADMIN — CHOLECALCIFEROL CAP 125 MCG (5000 UNIT) SCH UNIT: 125 CAP at 22:27

## 2019-01-16 RX ADMIN — SODIUM CHLORIDE SCH MLS: 0.45 INJECTION, SOLUTION INTRAVENOUS at 21:12

## 2019-01-16 RX ADMIN — Medication SCH ML: at 21:13

## 2019-01-16 NOTE — RAD REPORT
EXAM DESCRIPTION:  US - Renal Ultrasound-Complete - 1/16/2019 9:25 am

 

CLINICAL HISTORY:  . Acute renal failure

 

COMPARISON:  2015

 

FINDINGS:  The right kidney measures 13 cm with an increased echotexture. A 5.6 centimeter right patrick
l cyst

 

The left kidney measures cm with an increased echotexture.

 

Hydronephrosis is not seen.

 

IMPRESSION:  A 5.6 centimeter right renal cyst

 

Increased renal echotexture consistent with parenchymal disease

## 2019-01-16 NOTE — PN
Date of Progress Note:  01/15/2019



Subjective:  The patient was seen this morning for followup.  No new complaints or problems reported.
  Lying in bed, not in any distress.



Objective:  HEENT:  Unremarkable. 

Lungs:  Clear to auscultation. 

Heart:  Sounds normal. 

Abdomen:  Soft.  Bowel sounds normal.  No guarding, rigidity, tenderness, distention. 

Extremities:  Show no leg edema.



Impression:  

1.Urinary tract infection.

2.Schizophrenia.

3.Seizure disorder.

4.Hypokalemia.

5.Hypertension.



Plan:  We will go ahead and continue physical therapy.  Continue current antibiotics.  We are waiting
 on disposition for the patient to go to nursing home.  Details were discussed with social service wh
o is working with the nursing home and family member for discharge planning.  The patient is medicall
y stable for discharge soon as arrangements gets completed for him to go to nursing home.





ZAIRE/MODL

DD:  01/15/2019 21:04:46Voice ID:  [id number]

DT:  01/16/2019 01:30:33Report ID:  [id number]

## 2019-01-16 NOTE — P.CNS
Date of Consult: 01/16/19


Reason for Consult: LEONIDES


Requesting Physician: Colten Petty


Primary Care Provider: Dr. Petty


Chief Complaint: AMS


History of Present Illness: 


69 yo WM HTN presented to the ER with AMS.  On January 16, he developed LEONIDES of 

unclear etiology.  The family and the patient report no acute events from 

January 14-16.  No urinary difficulties.  No NSAIDs.  He received IV contrast 

on January 9th.





Dr. Petty:  A 68-year-old male patient who was under my care for many years 

until sometime in November of this year.  He was hearing some voices and he was 

brought into our emergency room.  At that time, he was living at independent 

facility across the street from hospital called [facility].  From our 

hospital he was sent to [facility] of the [facility] in [place], where he was 

kept for about 2 weeks and as family informs me today about all these details, 

they told me that at end of 2 weeks, family was contacted by this facility and 

they were told that he is ready for discharge and for them to come and pick him 

up.  When they went to that facility, they found him sitting in wheelchair and 

he was discharged with some discharge medications.  When family brought him home

, they realized that he was extremely weak, that they had hard time getting him 

out of car to the house.  Family was not able to take care of him as they work 

and rather the patient was not able to take care of himself because of his 

condition, so they contacted the nursing home and placed him in [facility] and all the paperwork that they got from [place], they gave it to 

the nursing home, that included his medication list, and as I understand from 

family that this medication list were continued at the nursing home.  He has 

not done well in nursing home.  He has been very sleepy and weak to the extent 

that today he was sent to the emergency room because of altered mental status.  

After he was evaluated in the ER, we found out that the patient actually had 

phenobarbital toxicity and I was contacted requesting admission to the hospital 

for this problem.  When I saw him in the evening, he was lying in bed, trying 

to open his eyes, but did not answer any of my questions.





ER:  14:01 This 68 yrs old  Male presents to ER via EMS with 

complaints of General        kdr 


      Weakness.                                                                

                   


14:01 The patient was sent from the NH because he was leaning more than usual 

at breakfast    kdr 


      and not eating as much. Onset: The symptoms/episode began/occurred this 

morning.            


      Severity of symptoms: At their worst the symptoms were mild in the 

emergency department     


      the symptoms The patient is reported to be at his baseline on 

presentation to the ED.       


      Unable to elicit any history or discernable problem at time of 

presentation. It is          


      unknown whether or not the patient has had similar symptoms in the past. 

It is unknown      


      whether or not the patient has recently seen a physician.                

                   


Allergies





No Known Drug Allergies Allergy (Verified 08/10/15 10:12)


 Unknown


No Known Allergies Allergy (Uncoded 02/05/17 03:05)


 Unknown





Home medications list reviewed: Yes


Home Medications: 








Aripiprazole [Abilify] 20 mg PO DAILY 01/04/19 


Lisinopril [Prinivil] 5 mg PO BEDTIME 01/04/19 


Metoprolol Tartrate [Lopressor] 12.5 mg PO BID 01/04/19 


OLANZapine [Zyprexa] 5 mg PO BEDTIME 01/04/19 


PHENobarbital [Phenobarbital] 97.2 mg PO Q8H 01/04/19 


Phenytoin Sodium Extended [Dilantin] 100 mg PO TID 01/04/19 


Tamsulosin [Flomax*] 1 cap PO BEDTIME 01/04/19 








- Past Medical/Surgical History


Diabetic: No


-: Seizures 2008


-: hypertension


-: schizophrenia


-: colon cancer


-: cva


-: lung sx (right)


-: colon sx


-: Appendectomy





- Family History


  ** Father


Medical History: Heart disease





- Social History


Smoking Status: Former smoker


Alcohol use: No


CD- Drugs: No


Caffeine use: Yes


Place of Residence: Nursing Home





Review of Systems


10-point ROS is otherwise unremarkable





Physical Examination














Temp Pulse Resp BP Pulse Ox


 


 98.3 F   70   16   154/84 H  94 


 


 01/16/19 20:00  01/16/19 21:13  01/16/19 20:00  01/16/19 21:13  01/16/19 20:00








General: Alert, In no apparent distress, Cooperative


HEENT: Atraumatic, Mucous membr. moist/pink


Neck: Supple


Respiratory: Clear to auscultation bilaterally


Cardiovascular: No edema, Regular rate/rhythm, No rubs


Gastrointestinal: Soft and benign, Non-distended


Musculoskeletal: No clubbing, No contractures


Integumentary: No rashes, No cyanosis


Neurological: Normal speech





Serum creatinine: 0.9(Jan 14); 2.73(Jan 16).


Blood work reviewed in the chart.


Imagings Data: 





EXAM DESCRIPTION:  US - Renal Ultrasound-Complete - 1/16/2019 9:25 am


CLINICAL HISTORY:  . Acute renal failure


COMPARISON:  2015


FINDINGS:  The right kidney measures 13 cm with an increased echotexture. A 5.6 

centimeter right renal cyst


The left kidney measures cm with an increased echotexture.


Hydronephrosis is not seen.


IMPRESSION:  A 5.6 centimeter right renal cyst


Increased renal echotexture consistent with parenchymal disease


Conclusions/Impression: 





LEONIDES of unclear etiology.  Differential includes vancomycin toxicity in the 

setting of recent IV contrast exposure, Pre-Renal Azotemia, ATN.


Persistent Hypernatremia suspicious for possible underlying Diabetes Insipidus.


Hypokalemia.


HTN.


Anemia in chronic illness.


Hypocalcemia.


Mild Rhabdomyolysis.


Hypoalbuminemia.





P/ Continue current POC and Medications.


Change IVF 1/2NS.


Agree with holding vancomycin.


Agree with potassium replacement.


Start Vitamin D.


No NSAIDs.


AM labs.  Daily weight.





Thank you kindly for the consultation.

## 2019-01-17 LAB
ALBUMIN SERPL BCP-MCNC: 2.4 G/DL (ref 3.4–5)
ALP SERPL-CCNC: 61 U/L (ref 45–117)
ALT SERPL W P-5'-P-CCNC: 17 U/L (ref 12–78)
AST SERPL W P-5'-P-CCNC: 16 U/L (ref 15–37)
BUN BLD-MCNC: 11 MG/DL (ref 7–18)
GLUCOSE SERPLBLD-MCNC: 87 MG/DL (ref 74–106)
HCT VFR BLD CALC: 31.3 % (ref 39.6–49)
LYMPHOCYTES # SPEC AUTO: 1 K/UL (ref 0.7–4.9)
PMV BLD: 8.3 FL (ref 7.6–11.3)
POTASSIUM SERPL-SCNC: 3.6 MMOL/L (ref 3.5–5.1)
RBC # BLD: 3.24 M/UL (ref 4.33–5.43)
UA COMPLETE W REFLEX CULTURE PNL UR: (no result)
URATE SERPL-MCNC: 5.5 MG/DL (ref 3.5–7.2)

## 2019-01-17 RX ADMIN — LISINOPRIL SCH MG: 10 TABLET ORAL at 21:56

## 2019-01-17 RX ADMIN — PHENYTOIN SODIUM SCH MG: 100 CAPSULE, EXTENDED RELEASE ORAL at 21:57

## 2019-01-17 RX ADMIN — SODIUM CHLORIDE SCH MLS: 0.45 INJECTION, SOLUTION INTRAVENOUS at 21:53

## 2019-01-17 RX ADMIN — PHENYTOIN SODIUM SCH MG: 100 CAPSULE, EXTENDED RELEASE ORAL at 14:16

## 2019-01-17 RX ADMIN — SODIUM CHLORIDE SCH MLS: 0.45 INJECTION, SOLUTION INTRAVENOUS at 14:17

## 2019-01-17 RX ADMIN — TAMSULOSIN HYDROCHLORIDE SCH MG: 0.4 CAPSULE ORAL at 21:57

## 2019-01-17 RX ADMIN — SODIUM CHLORIDE SCH MLS: 0.45 INJECTION, SOLUTION INTRAVENOUS at 05:46

## 2019-01-17 RX ADMIN — METOPROLOL TARTRATE SCH MG: 25 TABLET ORAL at 09:52

## 2019-01-17 RX ADMIN — CHOLECALCIFEROL CAP 125 MCG (5000 UNIT) SCH UNIT: 125 CAP at 09:52

## 2019-01-17 RX ADMIN — Medication SCH ML: at 21:53

## 2019-01-17 RX ADMIN — PHENYTOIN SODIUM SCH MG: 100 CAPSULE, EXTENDED RELEASE ORAL at 09:51

## 2019-01-17 RX ADMIN — Medication SCH: at 09:00

## 2019-01-17 RX ADMIN — ACETAMINOPHEN PRN MG: 500 TABLET, FILM COATED ORAL at 17:40

## 2019-01-17 RX ADMIN — METOPROLOL TARTRATE SCH MG: 25 TABLET ORAL at 21:57

## 2019-01-17 RX ADMIN — CALCITRIOL CAPSULES 0.25 MCG SCH MCG: 0.25 CAPSULE ORAL at 09:52

## 2019-01-17 RX ADMIN — TAMSULOSIN HYDROCHLORIDE SCH MG: 0.4 CAPSULE ORAL at 09:52

## 2019-01-17 NOTE — PN
Date of Progress Note:  01/16/2019



Subjective:  The patient was seen this morning for followup.  Sleeping, arousable, not in any distres
s.  Denies any complaints.  No vomiting.  No diarrhea.  Ambulating well with Physical Therapy as he r
eports.



Objective:  Vital Signs:  Reviewed. 

HEENT:  Unremarkable. 

Lungs:  Clear to auscultation. 

Heart:  Sounds normal. 

Abdomen:  Soft.  Bowel sounds are normal.  No guarding, rigidity, tenderness, or distention. 

Extremities:  No leg edema.



Laboratory Data:  Vancomycin trough level 16.3.  Dilantin level 11.4.  Phenobarbital level 29.1.  Sod
ium 148, potassium 3.6, chloride 112, bicarb 28, BUN 13, creatinine 2.73, glucose 94.  His creatinine
 day before yesterday was 0.90.  White count today 7.2, hemoglobin 10.8, and platelets 282.



Impression:  

1.Acute renal failure.

2.Urinary tract infection.

3.Seizure disorder.

4.Schizophrenia.

5.Phenobarbital toxicity, resolved.

6.Anemia.



Plan:  The patient's acute renal failure could be due to multiple factors.  Currently, he has been on
 Zosyn and vancomycin for little over a week.  We will go ahead and discontinue these antibiotics as 
it could be potentially causing the acute renal failure.  We will also discontinue his lisinopril in 
setting of acute renal failure.  At an appropriate time, we will consider to restart it.  IV fluid wi
ll be given at 125 cc/hour.  Renal ultrasound was done today and is unremarkable except for benign-ap
pearing renal cysts.  Nephrology 

consultation was requested for this acute renal failure, and we will repeat blood work this evening, 
and we will follow up on it.





ZAIRE/MODL

DD:  01/16/2019 19:15:56Voice ID:  [id number]

DT:  01/17/2019 00:19:50Report ID:  [id number]

## 2019-01-17 NOTE — P.PN
Date of Service: 01/17/19


Vital Signs











Temp Pulse Resp BP Pulse Ox


 


 99.2 F   66   18   168/74 H  98 


 


 01/17/19 16:00  01/17/19 16:00  01/17/19 16:00  01/17/19 16:00  01/17/19 16:00





Medications





Acetaminophen (Tylenol -Extra Strength)  500 mg PO Q6H PRN


   PRN Reason: PAIN-or-TEMP


   Stop: 02/02/19 16:50


   Last Admin: 01/17/19 17:40 Dose:  500 mg


Aripiprazole (Abilify)  20 mg PO DAILY PAIGE


   Stop: 02/04/19 09:01


   Last Admin: 01/17/19 09:51 Dose:  20 mg


Calcitriol (Rocaltrol)  0.5 mcg PO DAILY PAIGE


   Stop: 02/15/19 21:16


   Last Admin: 01/17/19 09:52 Dose:  0.5 mcg


Cholecalciferol (Vitamin D 5,000 Iu Cap)  5,000 unit PO DAILY PAIGE


   Stop: 02/15/19 21:16


   Last Admin: 01/17/19 09:52 Dose:  5,000 unit


Sodium Chloride (Sodium Chloride 0.45%)  1,000 mls @ 100 mls/hr IV .Q10H PAIGE


   Stop: 02/16/19 21:01


Lisinopril (Prinivil)  10 mg PO BEDTIME PAIGE


   Stop: 02/16/19 21:01


Metoprolol Tartrate (Lopressor)  12.5 mg PO BID PAIGE


   Stop: 02/03/19 21:01


   Last Admin: 01/17/19 09:52 Dose:  12.5 mg


Olanzapine (Zyprexa)  5 mg PO BEDTIME PAIGE


   Stop: 02/03/19 21:01


   Last Admin: 01/16/19 21:11 Dose:  5 mg


Ondansetron HCl (Zofran)  4 mg IV Q4H PRN


   PRN Reason: NAUSEA / VOMITING


Phenytoin Sodium (Dilantin Er Cap)  100 mg PO TID PAIGE


   Stop: 02/03/19 21:01


   Last Admin: 01/17/19 14:16 Dose:  100 mg


Potassium Chloride (Klor-Con 10 Meq Tab)  20 meq PO 1X ONE


   Stop: 01/17/19 20:40


Sodium Chloride (Normal Saline Flush)  10 ml IV BID PAIGE


   Stop: 02/02/19 21:01


   Last Admin: 01/17/19 09:00 Dose:  Not Given


Tamsulosin HCl (Flomax)  0.4 mg PO BID PAIGE


   Stop: 02/15/19 21:01


   Last Admin: 01/17/19 09:52 Dose:  0.4 mg


Microbiology Results





01/03/19 14:05   Blood  - Blood   Aerobic Blood Culture - Final


                            No growth in 5 days.


01/03/19 14:05   Blood  - Blood   Anaerobic Blood Culture - Final


                            No growth in 5 days.


01/03/19 13:50   Blood  - Blood   Aerobic Blood Culture - Final


                            No growth in 5 days.


01/03/19 13:50   Blood  - Blood   Anaerobic Blood Culture - Final


                            No growth in 5 days.





Assessment/ Plan: 


Nephrology.





Doing well.


CPS stable without CP or SOB.  +Urine output


No acute events overnight.





Vitals, medications, blood work and imaging reviewed in the chart.


General: Alert, In no apparent distress, Cooperative


HEENT: Atraumatic, Mucous membr. moist/pink


Neck: Supple


Respiratory: Clear to auscultation bilaterally


Cardiovascular: No edema, Regular rate/rhythm, No rubs


Gastrointestinal: Soft and benign, Non-distended


Musculoskeletal: No clubbing, No contractures


Integumentary: No rashes, No cyanosis


Neurological: Normal speech





Serum creatinine: 0.9(Jan 14); 2.73(Jan 16).


Blood work reviewed in the chart.





Imagings Data: 


EXAM DESCRIPTION:  US - Renal Ultrasound-Complete - 1/16/2019 9:25 am


CLINICAL HISTORY:  . Acute renal failure


COMPARISON:  2015


FINDINGS:  The right kidney measures 13 cm with an increased echotexture. A 5.6 

centimeter right renal cyst


The left kidney measures cm with an increased echotexture.


Hydronephrosis is not seen.


IMPRESSION:  A 5.6 centimeter right renal cyst


Increased renal echotexture consistent with parenchymal disease





Conclusions/Impression: 


LEONIDES suspicious for multi-factorial ATN.  Differential includes vancomycin 

toxicity in the setting of recent IV contrast exposure, Pre-Renal Azotemia, ATN.


Persistent Hypernatremia suspicious for possible underlying Diabetes Insipidus.


Hypokalemia.


HTN.


Anemia in chronic illness.


Hypocalcemia.


Mild Rhabdomyolysis.


Hypoalbuminemia.





P/ Continue current POC and Medications.


Reduce IVF.


Restart Lisinopril.  Give potassium.


Agree with holding vancomycin.


No NSAIDs.


AM labs.  Daily weight.

## 2019-01-18 LAB
BUN BLD-MCNC: 12 MG/DL (ref 7–18)
GLUCOSE SERPLBLD-MCNC: 85 MG/DL (ref 74–106)
POTASSIUM SERPL-SCNC: 3.7 MMOL/L (ref 3.5–5.1)

## 2019-01-18 RX ADMIN — PHENYTOIN SODIUM SCH MG: 100 CAPSULE, EXTENDED RELEASE ORAL at 21:28

## 2019-01-18 RX ADMIN — PHENYTOIN SODIUM SCH MG: 100 CAPSULE, EXTENDED RELEASE ORAL at 14:54

## 2019-01-18 RX ADMIN — LISINOPRIL SCH MG: 10 TABLET ORAL at 21:29

## 2019-01-18 RX ADMIN — CALCITRIOL CAPSULES 0.25 MCG SCH MCG: 0.25 CAPSULE ORAL at 08:46

## 2019-01-18 RX ADMIN — METOPROLOL TARTRATE SCH MG: 25 TABLET ORAL at 21:28

## 2019-01-18 RX ADMIN — Medication SCH ML: at 08:47

## 2019-01-18 RX ADMIN — SODIUM CHLORIDE SCH MLS: 0.45 INJECTION, SOLUTION INTRAVENOUS at 05:27

## 2019-01-18 RX ADMIN — PHENOBARBITAL SCH MG: 32.4 TABLET ORAL at 21:29

## 2019-01-18 RX ADMIN — CHOLECALCIFEROL CAP 125 MCG (5000 UNIT) SCH UNIT: 125 CAP at 08:46

## 2019-01-18 RX ADMIN — SODIUM CHLORIDE SCH MLS: 0.45 INJECTION, SOLUTION INTRAVENOUS at 17:57

## 2019-01-18 RX ADMIN — METOPROLOL TARTRATE SCH MG: 25 TABLET ORAL at 08:46

## 2019-01-18 RX ADMIN — PHENYTOIN SODIUM SCH MG: 100 CAPSULE, EXTENDED RELEASE ORAL at 08:47

## 2019-01-18 RX ADMIN — TAMSULOSIN HYDROCHLORIDE SCH MG: 0.4 CAPSULE ORAL at 21:28

## 2019-01-18 RX ADMIN — TAMSULOSIN HYDROCHLORIDE SCH MG: 0.4 CAPSULE ORAL at 08:46

## 2019-01-18 RX ADMIN — Medication SCH ML: at 21:30

## 2019-01-18 NOTE — PN
Date of Progress Note:  01/18/2019



Subjective:  The patient was seen this morning for followup.  Lying in bed, not 
in distress.



Objective:  Vital Signs:  Reviewed. 

HEENT:  Unremarkable. 

Lungs:  Clear to auscultation. 

Heart:  Sounds normal. 

Abdomen:  Soft.  Bowel sounds normal.  No guarding, rigidity, tenderness or 
distention. 

Extremities:  No leg edema.



Laboratory Data:  Sodium 145, potassium 3.7, chloride 111, bicarb 26, BUN 12, 
creatinine 2.34, creatinine day before yesterday was 2.73.



Impression:  

1.   Acute renal failure, improving.

2.   Phenobarbital toxicity, resolved.

3.   Hypertension.

4.   Seizure disorder.

5.   Schizophrenia.



Plan:  We will continue current medication and IV fluid.  Continue to follow 
with nephrologist.  Renal function is improving, but slowly on a day-to-day 
basis.  We will keep him in the hospital over the weekend.  Earliest possible 
discharge to go to nursing home is this coming week on Monday.  Details were 
discussed with the patient.





ZAIRE/MERE

DD:  01/18/2019 09:20:16   Voice ID:  [id number]

DT:  01/18/2019 14:35:40   Report ID:  [id number]

JAYRO

## 2019-01-18 NOTE — P.PN
Date of Service: 01/18/19


Vital Signs











Temp Pulse Resp BP Pulse Ox


 


 99.1 F   65   18   141/73 H  96 


 


 01/18/19 16:00  01/18/19 16:00  01/18/19 16:00  01/18/19 16:00  01/18/19 16:00





Medications





Acetaminophen (Tylenol -Extra Strength)  500 mg PO Q6H PRN


   PRN Reason: PAIN-or-TEMP


   Stop: 02/02/19 16:50


   Last Admin: 01/17/19 17:40 Dose:  500 mg


Aripiprazole (Abilify)  20 mg PO DAILY [facility]


   Stop: 02/04/19 09:01


   Last Admin: 01/18/19 08:46 Dose:  20 mg


Calcitriol (Rocaltrol)  0.5 mcg PO DAILY [facility]


   Stop: 02/15/19 21:16


   Last Admin: 01/18/19 08:46 Dose:  0.5 mcg


Cholecalciferol (Vitamin D 5,000 Iu Cap)  5,000 unit PO DAILY [facility]


   Stop: 02/15/19 21:16


   Last Admin: 01/18/19 08:46 Dose:  5,000 unit


Sodium Chloride (Sodium Chloride 0.45%)  1,000 mls @ 100 mls/hr IV .Q10H [facility]


   Stop: 02/16/19 21:01


   Last Admin: 01/18/19 17:57 Dose:  1,000 mls


Lisinopril (Prinivil)  10 mg PO BEDTIME [facility]


   Stop: 02/16/19 21:01


   Last Admin: 01/17/19 21:56 Dose:  10 mg


Metoprolol Tartrate (Lopressor)  12.5 mg PO BID [facility]


   Stop: 02/03/19 21:01


   Last Admin: 01/18/19 08:46 Dose:  12.5 mg


Olanzapine (Zyprexa)  5 mg PO BEDTIME [facility]


   Stop: 02/03/19 21:01


   Last Admin: 01/17/19 21:56 Dose:  5 mg


Ondansetron HCl (Zofran)  4 mg IV Q4H PRN


   PRN Reason: NAUSEA / VOMITING


Phenobarbital (Phenobarbital)  32.4 mg PO TID [facility]


   Stop: 01/23/19 21:01


Phenytoin Sodium (Dilantin Er Cap)  100 mg PO TID [facility]


   Stop: 02/03/19 21:01


   Last Admin: 01/18/19 14:54 Dose:  100 mg


Sodium Chloride (Normal Saline Flush)  10 ml [facility]ID [facility]


   Stop: 02/02/19 21:01


   Last Admin: 01/18/19 08:47 Dose:  10 ml


Tamsulosin HCl (Flomax)  0.4 mg PO BID [facility]


   Stop: 02/15/19 21:01


   Last Admin: 01/18/19 08:46 Dose:  0.4 mg


Microbiology Results





01/03/19 14:05   Blood  - Blood   Aerobic Blood Culture - Final


                            No growth in 5 days.


01/03/19 14:05   Blood  - Blood   Anaerobic Blood Culture - Final


                            No growth in 5 days.


01/03/19 13:50   Blood  - Blood   Aerobic Blood Culture - Final


                            No growth in 5 days.


01/03/19 13:50   Blood  - Blood   Anaerobic Blood Culture - Final


                            No growth in 5 days.





Assessment/ Plan: 


Nephrology.





Doing well.


CPS stable without CP or SOB.  +Urine output


No acute events overnight.





Vitals, medications, blood work and imaging reviewed in the chart.


General: Alert, In no apparent distress, Cooperative


HEENT: Atraumatic, Mucous membr. moist/pink


Neck: Supple


Respiratory: Clear to auscultation bilaterally


Cardiovascular: No edema, Regular rate/rhythm, No rubs


Gastrointestinal: Soft and benign, Non-distended


Musculoskeletal: No clubbing, No contractures


Integumentary: No rashes, No cyanosis


Neurological: Normal speech





Serum creatinine: 0.9(Jan 14); 2.73(Jan 16).


Blood work reviewed in the chart.





Imagings Data: 


EXAM DESCRIPTION:  US - Renal Ultrasound-Complete - 1/16/2019 9:25 am


CLINICAL HISTORY:  . Acute renal failure


COMPARISON:  2015


FINDINGS:  The right kidney measures 13 cm with an increased echotexture. A 5.6 

centimeter right renal cyst


The left kidney measures cm with an increased echotexture.


Hydronephrosis is not seen.


IMPRESSION:  A 5.6 centimeter right renal cyst


Increased renal echotexture consistent with parenchymal disease





Conclusions/Impression: 


LEONIDES suspicious for multi-factorial ATN.  Differential includes vancomycin 

toxicity in the setting of recent IV contrast exposure, Pre-Renal Azotemia, ATN.


Persistent Hypernatremia suspicious for possible underlying Diabetes Insipidus.


Hypokalemia.


HTN.


Anemia in chronic illness.


Hypocalcemia.


Mild Rhabdomyolysis.


Hypoalbuminemia.





P/ Continue current POC and Medications.


Gentle IVF.


Agree with holding vancomycin.


No NSAIDs.


AM labs.  Daily weight.





If the patient is ready for discharge and able to maintain adequate hydration, 

consider discharging back to the SNF with a repeat BMP in 4-7 days.  The ATN 

should continue to improve over the next several days to weeks.

## 2019-01-18 NOTE — PN
Date of Progress Note:  01/17/2019



Subjective:  The patient was seen this morning for followup.  Lying in bed, not in any distress.  Den
ies any complaints.



Objective:  Vital signs:  Reviewed. 

HEENT:  Unremarkable. 

Lungs:  Clear to auscultation. 

Heart:  Sounds normal. 

Abdomen:  Soft.  Bowel sounds normal.  No guarding, rigidity, tenderness, or distention. 

Extremities:  No leg edema.



Laboratory Data:  White count 6.9, hemoglobin 10.4, and platelets 248.  Sodium 146, potassium 3.6, ch
loride 112, bicarb 27, BUN 11, creatinine 2.48, and glucose 87.  Liver function tests unremarkable.



Impression:  

1.Acute kidney failure.

2.Anemia.

3.Hypertension.

4.Schizophrenia.

5.Seizure disorder.



Plan:  We will go ahead and continue current IV fluid.  The patient's renal function is slowly improv
ing.  Yesterday, creatinine was 2.73.  Today, it is 2.48.  Renal ultrasound was unremarkable except b
enign-appearing kidney cyst.  We will continue to follow up with nephrologist, monitor blood work on 
a daily basis, and I will see him tomorrow for followup.  Once renal function normalizes, then our pl
an is to discharge him to go to nursing home.  Zosyn and vancomycin were 

discontinued yesterday.  Lisinopril was also discontinued yesterday once acute renal failure problem 
was noted.





ZAIRE/MODL

DD:  01/17/2019 20:13:20Voice ID:  [id number]

DT:  01/18/2019 00:50:54Report ID:  [id number]

## 2019-01-19 LAB
BUN BLD-MCNC: 11 MG/DL (ref 7–18)
GLUCOSE SERPLBLD-MCNC: 78 MG/DL (ref 74–106)
POTASSIUM SERPL-SCNC: 3.4 MMOL/L (ref 3.5–5.1)

## 2019-01-19 RX ADMIN — SODIUM CHLORIDE SCH MLS: 0.45 INJECTION, SOLUTION INTRAVENOUS at 21:11

## 2019-01-19 RX ADMIN — PHENYTOIN SODIUM SCH MG: 100 CAPSULE, EXTENDED RELEASE ORAL at 21:10

## 2019-01-19 RX ADMIN — PHENYTOIN SODIUM SCH MG: 100 CAPSULE, EXTENDED RELEASE ORAL at 13:26

## 2019-01-19 RX ADMIN — ACETAMINOPHEN PRN MG: 500 TABLET, FILM COATED ORAL at 04:59

## 2019-01-19 RX ADMIN — SODIUM CHLORIDE SCH MLS: 0.45 INJECTION, SOLUTION INTRAVENOUS at 12:56

## 2019-01-19 RX ADMIN — LISINOPRIL SCH MG: 10 TABLET ORAL at 21:10

## 2019-01-19 RX ADMIN — Medication SCH ML: at 08:28

## 2019-01-19 RX ADMIN — CHOLECALCIFEROL CAP 125 MCG (5000 UNIT) SCH UNIT: 125 CAP at 08:28

## 2019-01-19 RX ADMIN — PHENYTOIN SODIUM SCH MG: 100 CAPSULE, EXTENDED RELEASE ORAL at 08:27

## 2019-01-19 RX ADMIN — CALCITRIOL CAPSULES 0.25 MCG SCH MCG: 0.25 CAPSULE ORAL at 08:27

## 2019-01-19 RX ADMIN — TAMSULOSIN HYDROCHLORIDE SCH MG: 0.4 CAPSULE ORAL at 21:11

## 2019-01-19 RX ADMIN — TAMSULOSIN HYDROCHLORIDE SCH MG: 0.4 CAPSULE ORAL at 08:28

## 2019-01-19 RX ADMIN — PHENOBARBITAL SCH MG: 32.4 TABLET ORAL at 21:11

## 2019-01-19 RX ADMIN — METOPROLOL TARTRATE SCH MG: 25 TABLET ORAL at 21:09

## 2019-01-19 RX ADMIN — Medication SCH ML: at 21:11

## 2019-01-19 RX ADMIN — PHENOBARBITAL SCH MG: 32.4 TABLET ORAL at 13:25

## 2019-01-19 RX ADMIN — METOPROLOL TARTRATE SCH MG: 25 TABLET ORAL at 08:28

## 2019-01-19 RX ADMIN — PHENOBARBITAL SCH MG: 32.4 TABLET ORAL at 08:28

## 2019-01-19 RX ADMIN — SODIUM CHLORIDE SCH MLS: 0.45 INJECTION, SOLUTION INTRAVENOUS at 01:20

## 2019-01-19 NOTE — PN
Date of Progress Note:  01/19/2019



Subjective:  The patient was seen this morning for followup, lying in bed, not in any distress.



Objective:  Vital Signs:  Reviewed. 

HEENT:  Examination unremarkable. 

Lungs:  Clear to auscultation. 

Heart:  Sounds normal. 

Abdomen:  Soft.  Bowel sounds normal.  No guarding, rigidity, tenderness, or distention. 

Extremities:  No leg edema.



Laboratory Data:  Sodium 145, potassium 3.4, chloride 112, bicarb 26, BUN 11, creatinine 2.15, glucos
e 78.



Impression:  

1.Acute renal failure.

2.Hypertension.

3.Seizure disorder.

4.Schizophrenia.



Plan:  We will continue current medication.  Continue current IV fluid.  The patient's renal function
 is slowly improving.  We will monitor renal function on a daily basis.  I will see him tomorrow for 
followup.  Ambulation was encouraged.  Possible discharge to go to nursing home on Monday or Tuesday 
depending on his renal function.





ZAIRE/MODL

DD:  01/19/2019 11:48:53Voice ID:  [id number]

DT:  01/19/2019 15:31:20Report ID:  [id number]

## 2019-01-20 LAB
ALBUMIN SERPL BCP-MCNC: 2.5 G/DL (ref 3.4–5)
ALP SERPL-CCNC: 65 U/L (ref 45–117)
ALT SERPL W P-5'-P-CCNC: 12 U/L (ref 12–78)
AST SERPL W P-5'-P-CCNC: 14 U/L (ref 15–37)
BUN BLD-MCNC: 10 MG/DL (ref 7–18)
GLUCOSE SERPLBLD-MCNC: 83 MG/DL (ref 74–106)
HCT VFR BLD CALC: 30.5 % (ref 39.6–49)
LYMPHOCYTES # SPEC AUTO: 0.9 K/UL (ref 0.7–4.9)
PMV BLD: 8.5 FL (ref 7.6–11.3)
POTASSIUM SERPL-SCNC: 3.6 MMOL/L (ref 3.5–5.1)
RBC # BLD: 3.19 M/UL (ref 4.33–5.43)

## 2019-01-20 RX ADMIN — SODIUM CHLORIDE SCH MLS: 0.45 INJECTION, SOLUTION INTRAVENOUS at 09:19

## 2019-01-20 RX ADMIN — PHENYTOIN SODIUM SCH MG: 100 CAPSULE, EXTENDED RELEASE ORAL at 09:20

## 2019-01-20 RX ADMIN — PHENOBARBITAL SCH MG: 32.4 TABLET ORAL at 20:26

## 2019-01-20 RX ADMIN — PHENYTOIN SODIUM SCH MG: 100 CAPSULE, EXTENDED RELEASE ORAL at 20:26

## 2019-01-20 RX ADMIN — Medication SCH ML: at 09:00

## 2019-01-20 RX ADMIN — TAMSULOSIN HYDROCHLORIDE SCH MG: 0.4 CAPSULE ORAL at 09:21

## 2019-01-20 RX ADMIN — METOPROLOL TARTRATE SCH MG: 25 TABLET ORAL at 20:27

## 2019-01-20 RX ADMIN — PHENYTOIN SODIUM SCH MG: 100 CAPSULE, EXTENDED RELEASE ORAL at 13:55

## 2019-01-20 RX ADMIN — PHENOBARBITAL SCH MG: 32.4 TABLET ORAL at 13:55

## 2019-01-20 RX ADMIN — AMLODIPINE BESYLATE SCH MG: 2.5 TABLET ORAL at 13:55

## 2019-01-20 RX ADMIN — SODIUM CHLORIDE SCH MLS: 0.45 INJECTION, SOLUTION INTRAVENOUS at 18:12

## 2019-01-20 RX ADMIN — CALCITRIOL CAPSULES 0.25 MCG SCH MCG: 0.25 CAPSULE ORAL at 09:20

## 2019-01-20 RX ADMIN — METOPROLOL TARTRATE SCH MG: 25 TABLET ORAL at 09:20

## 2019-01-20 RX ADMIN — PHENOBARBITAL SCH MG: 32.4 TABLET ORAL at 09:21

## 2019-01-20 RX ADMIN — CHOLECALCIFEROL CAP 125 MCG (5000 UNIT) SCH UNIT: 125 CAP at 09:20

## 2019-01-20 RX ADMIN — Medication SCH: at 20:28

## 2019-01-20 RX ADMIN — LISINOPRIL SCH MG: 10 TABLET ORAL at 20:27

## 2019-01-20 RX ADMIN — TAMSULOSIN HYDROCHLORIDE SCH MG: 0.4 CAPSULE ORAL at 20:26

## 2019-01-20 NOTE — PN
Date of Progress Note:  01/20/2019



Subjective:  The patient was seen this morning for followup, lying in bed, not in distress.  Denies a
ny chest pain, shortness of breath, or abdominal pain.  No nausea.  No vomiting.



Objective:  Vital Signs:  Reviewed. 

HEENT:  Unremarkable. 

Lungs:  Clear to auscultation. 

Heart:  Sounds normal. 

Abdomen:  Soft.  Bowel sounds normal.  No guarding, rigidity, tenderness, or distention. 

Extremities:  No leg edema.



Laboratory Data:  White count 6.5, hemoglobin 10.3, platelets 281.  Sodium 146, potassium 3.6, chlori
de 111, bicarb 26, BUN 10, creatinine 2.04, glucose 83, magnesium 1.8.  Liver function tests unremark
able.



Impression:  

1.Acute renal failure.

2.Seizure disorder.

3.Schizophrenia.

4.Hypertension.



Plan:  We will continue current medications.  Continue IV fluid.  Renal function is slowly improving.
  Possible discharge to go to nursing home day after tomorrow, and this was discussed with the patien
t today.  We will monitor blood work tomorrow.





ZAIRE/MODL

DD:  01/20/2019 11:16:34Voice ID:  [id number]

DT:  01/20/2019 11:51:07Report ID:  [id number]

## 2019-01-21 LAB
BUN BLD-MCNC: 8 MG/DL (ref 7–18)
GLUCOSE SERPLBLD-MCNC: 90 MG/DL (ref 74–106)
MAGNESIUM SERPL-MCNC: 2 MG/DL (ref 1.8–2.4)
POTASSIUM SERPL-SCNC: 3.4 MMOL/L (ref 3.5–5.1)

## 2019-01-21 RX ADMIN — TAMSULOSIN HYDROCHLORIDE SCH MG: 0.4 CAPSULE ORAL at 21:14

## 2019-01-21 RX ADMIN — SODIUM CHLORIDE SCH MLS: 0.45 INJECTION, SOLUTION INTRAVENOUS at 04:35

## 2019-01-21 RX ADMIN — PHENYTOIN SODIUM SCH MG: 100 CAPSULE, EXTENDED RELEASE ORAL at 21:19

## 2019-01-21 RX ADMIN — Medication SCH ML: at 10:04

## 2019-01-21 RX ADMIN — METOPROLOL TARTRATE SCH MG: 25 TABLET ORAL at 10:02

## 2019-01-21 RX ADMIN — TAMSULOSIN HYDROCHLORIDE SCH MG: 0.4 CAPSULE ORAL at 10:01

## 2019-01-21 RX ADMIN — METOPROLOL TARTRATE SCH MG: 25 TABLET ORAL at 21:17

## 2019-01-21 RX ADMIN — CALCITRIOL CAPSULES 0.25 MCG SCH MCG: 0.25 CAPSULE ORAL at 10:01

## 2019-01-21 RX ADMIN — PHENOBARBITAL SCH MG: 32.4 TABLET ORAL at 10:02

## 2019-01-21 RX ADMIN — LISINOPRIL SCH MG: 10 TABLET ORAL at 21:19

## 2019-01-21 RX ADMIN — SODIUM CHLORIDE SCH MLS: 0.45 INJECTION, SOLUTION INTRAVENOUS at 14:26

## 2019-01-21 RX ADMIN — PHENYTOIN SODIUM SCH MG: 100 CAPSULE, EXTENDED RELEASE ORAL at 10:04

## 2019-01-21 RX ADMIN — Medication SCH ML: at 21:20

## 2019-01-21 RX ADMIN — AMLODIPINE BESYLATE SCH: 2.5 TABLET ORAL at 09:00

## 2019-01-21 RX ADMIN — ACETAMINOPHEN PRN MG: 500 TABLET, FILM COATED ORAL at 07:53

## 2019-01-21 RX ADMIN — PHENYTOIN SODIUM SCH MG: 100 CAPSULE, EXTENDED RELEASE ORAL at 14:28

## 2019-01-21 RX ADMIN — PHENOBARBITAL SCH MG: 32.4 TABLET ORAL at 21:19

## 2019-01-21 RX ADMIN — CHOLECALCIFEROL CAP 125 MCG (5000 UNIT) SCH UNIT: 125 CAP at 10:01

## 2019-01-21 RX ADMIN — LISINOPRIL SCH MG: 10 TABLET ORAL at 10:07

## 2019-01-21 RX ADMIN — PHENOBARBITAL SCH MG: 32.4 TABLET ORAL at 14:27

## 2019-01-21 NOTE — P.PN
Date of Service: 01/21/19


Vital Signs











Temp Pulse Resp BP Pulse Ox


 


 99.3 F   89   18   143/67 H  94 


 


 01/21/19 08:00  01/21/19 10:07  01/21/19 08:00  01/21/19 10:07  01/21/19 08:00





Medications





Acetaminophen (Tylenol -Extra Strength)  500 mg PO Q6H PRN


   PRN Reason: PAIN-or-TEMP


   Stop: 02/02/19 16:50


   Last Admin: 01/21/19 07:53 Dose:  500 mg


Amlodipine Besylate (Norvasc)  2.5 mg PO DAILY [facility]


   Stop: 02/19/19 14:24


   Last Admin: 01/21/19 09:00 Dose:  Not Given


Aripiprazole (Abilify)  20 mg PO DAILY [facility]


   Stop: 02/04/19 09:01


   Last Admin: 01/21/19 10:00 Dose:  20 mg


Calcitriol (Rocaltrol)  0.5 mcg PO DAILY [facility]


   Stop: 02/15/19 21:16


   Last Admin: 01/21/19 10:01 Dose:  0.5 mcg


Cholecalciferol (Vitamin D 5,000 Iu Cap)  5,000 unit PO DAILY [facility]


   Stop: 02/15/19 21:16


   Last Admin: 01/21/19 10:01 Dose:  5,000 unit


Sodium Chloride (Sodium Chloride 0.45%)  1,000 mls @ 100 mls/hr IV .Q10H [facility]


   Stop: 02/16/19 21:01


   Last Admin: 01/21/19 04:35 Dose:  1,000 mls


Lisinopril (Prinivil)  10 mg PO BID [facility]


   Stop: 02/20/19 09:31


   Last Admin: 01/21/19 10:07 Dose:  10 mg


Metoprolol Tartrate (Lopressor)  12.5 mg PO BID [facility]


   Stop: 02/03/19 21:01


   Last Admin: 01/21/19 10:02 Dose:  12.5 mg


Olanzapine (Zyprexa)  5 mg PO BEDTIME [facility]


   Stop: 02/03/19 21:01


   Last Admin: 01/20/19 20:27 Dose:  5 mg


Ondansetron HCl (Zofran)  4 mg IV Q4H PRN


   PRN Reason: NAUSEA / VOMITING


Phenobarbital (Phenobarbital)  32.4 mg PO TID [facility]


   Stop: 01/23/19 21:01


   Last Admin: 01/21/19 10:02 Dose:  32.4 mg


Phenytoin Sodium (Dilantin Er Cap)  100 mg PO TID [facility]


   Stop: 02/03/19 21:01


   Last Admin: 01/21/19 10:04 Dose:  100 mg


Sodium Chloride (Normal Saline Flush)  10 ml IV BID [facility]


   Stop: 02/02/19 21:01


   Last Admin: 01/21/19 10:04 Dose:  10 ml


Tamsulosin HCl (Flomax)  0.4 mg PO BID [facility]


   Stop: 02/15/19 21:01


   Last Admin: 01/21/19 10:01 Dose:  0.4 mg


Microbiology Results





01/03/19 14:05   Blood  - Blood   Aerobic Blood Culture - Final


                            No growth in 5 days.


01/03/19 14:05   Blood  - Blood   Anaerobic Blood Culture - Final


                            No growth in 5 days.


01/03/19 13:50   Blood  - Blood   Aerobic Blood Culture - Final


                            No growth in 5 days.


01/03/19 13:50   Blood  - Blood   Anaerobic Blood Culture - Final


                            No growth in 5 days.





Assessment/ Plan: 


Nephrology.





Doing well.


CPS stable without CP or SOB.  +Urine output


No acute events overnight.





Vitals, medications, blood work and imaging reviewed in the chart.


General: Alert, In no apparent distress, Cooperative


HEENT: Atraumatic, Mucous membr. moist/pink


Neck: Supple


Respiratory: Clear to auscultation bilaterally


Cardiovascular: No edema, Regular rate/rhythm, No rubs


Gastrointestinal: Soft and benign, Non-distended


Musculoskeletal: No clubbing, No contractures


Integumentary: No rashes, No cyanosis


Neurological: Normal speech





Serum creatinine: 0.9(Jan 14); 2.73(Jan 16).


Blood work reviewed in the chart.





Imagings Data: 


EXAM DESCRIPTION:  US - Renal Ultrasound-Complete - 1/16/2019 9:25 am


CLINICAL HISTORY:  . Acute renal failure


COMPARISON:  2015


FINDINGS:  The right kidney measures 13 cm with an increased echotexture. A 5.6 

centimeter right renal cyst


The left kidney measures cm with an increased echotexture.


Hydronephrosis is not seen.


IMPRESSION:  A 5.6 centimeter right renal cyst


Increased renal echotexture consistent with parenchymal disease





Conclusions/Impression: 


LEONIDES suspicious for multi-factorial ATN.  Differential includes vancomycin 

toxicity in the setting of recent IV contrast exposure, Pre-Renal Azotemia, ATN.


Persistent Hypernatremia suspicious for possible underlying Diabetes Insipidus.


Hypokalemia.


HTN.


Anemia in chronic illness.


Hypocalcemia.


Mild Rhabdomyolysis.


Hypoalbuminemia.





P/ Continue current POC and Medications.


Gentle IVF.


Agree with potassium replacement.


Increase Lisinopril 10mg BID as tolerated.


Agree with holding vancomycin.


No NSAIDs.


AM labs.  Daily weight.





If the patient is ready for discharge and able to maintain adequate hydration, 

consider discharging back to the SNF with a repeat BMP in 4-7 days.  The ATN 

should continue to improve over the next several days to weeks.

## 2019-01-22 VITALS — SYSTOLIC BLOOD PRESSURE: 117 MMHG | TEMPERATURE: 97.3 F | DIASTOLIC BLOOD PRESSURE: 73 MMHG

## 2019-01-22 VITALS — OXYGEN SATURATION: 94 %

## 2019-01-22 LAB
BUN BLD-MCNC: 9 MG/DL (ref 7–18)
GLUCOSE SERPLBLD-MCNC: 89 MG/DL (ref 74–106)
POTASSIUM SERPL-SCNC: 3.4 MMOL/L (ref 3.5–5.1)

## 2019-01-22 RX ADMIN — Medication SCH ML: at 09:26

## 2019-01-22 RX ADMIN — SODIUM CHLORIDE SCH MLS: 0.45 INJECTION, SOLUTION INTRAVENOUS at 00:10

## 2019-01-22 RX ADMIN — TAMSULOSIN HYDROCHLORIDE SCH MG: 0.4 CAPSULE ORAL at 09:24

## 2019-01-22 RX ADMIN — AMLODIPINE BESYLATE SCH MG: 2.5 TABLET ORAL at 09:25

## 2019-01-22 RX ADMIN — PHENOBARBITAL SCH MG: 32.4 TABLET ORAL at 13:37

## 2019-01-22 RX ADMIN — SODIUM CHLORIDE SCH: 0.45 INJECTION, SOLUTION INTRAVENOUS at 11:00

## 2019-01-22 RX ADMIN — CHOLECALCIFEROL CAP 125 MCG (5000 UNIT) SCH UNIT: 125 CAP at 09:26

## 2019-01-22 RX ADMIN — PHENYTOIN SODIUM SCH MG: 100 CAPSULE, EXTENDED RELEASE ORAL at 13:37

## 2019-01-22 RX ADMIN — LISINOPRIL SCH MG: 10 TABLET ORAL at 09:26

## 2019-01-22 RX ADMIN — PHENOBARBITAL SCH MG: 32.4 TABLET ORAL at 09:24

## 2019-01-22 RX ADMIN — CALCITRIOL CAPSULES 0.25 MCG SCH MCG: 0.25 CAPSULE ORAL at 09:26

## 2019-01-22 RX ADMIN — PHENYTOIN SODIUM SCH MG: 100 CAPSULE, EXTENDED RELEASE ORAL at 09:24

## 2019-01-22 RX ADMIN — METOPROLOL TARTRATE SCH MG: 25 TABLET ORAL at 09:24

## 2019-01-22 NOTE — PN
Date of Progress Note:  01/21/2019



Subjective:  The patient was seen this morning for followup.  No new complaints, problems reported by
 the patient.  Lying in bed, not in distress.



Objective:  HEENT:  Unremarkable. 

Lungs:  Clear to auscultation. 

Heart:  Sounds normal. 

Abdomen:  Soft.  Bowel sounds normal.  No guarding, rigidity, tenderness, distention. 

Extremities:  No leg edema.



Laboratory Data:  Sodium 142, potassium 3.4, chloride 111, bicarb 25, BUN 8, creatinine 1.87, glucose
 90, magnesium 2.



Impression:  

1.Acute renal failure, improving.

2.Phenobarbital toxicity, improved.

3.Hypertension.

4.Seizure disorder.

5.Schizophrenia.



Plan:  The patient's renal function is improving very well.  Repeat another blood work tomorrow sacha rowland.  Replace electrolyte per protocol and possible discharge to go to the nursing home tomorrow if hi
s condition is stable.





ZAIRE/MODL

DD:  01/21/2019 19:07:47Voice ID:  [id number]

DT:  01/22/2019 00:41:07Report ID:  [id number]

## 2019-01-22 NOTE — P.PN
Date of Service: 01/22/19


Vital Signs











Temp Pulse Resp BP Pulse Ox


 


 97.3 F   84   20   117/73   96 


 


 01/22/19 12:00  01/22/19 12:00  01/22/19 12:00  01/22/19 12:00  01/22/19 12:00





Microbiology Results





01/03/19 14:05   Blood  - Blood   Aerobic Blood Culture - Final


                            No growth in 5 days.


01/03/19 14:05   Blood  - Blood   Anaerobic Blood Culture - Final


                            No growth in 5 days.


01/03/19 13:50   Blood  - Blood   Aerobic Blood Culture - Final


                            No growth in 5 days.


01/03/19 13:50   Blood  - Blood   Anaerobic Blood Culture - Final


                            No growth in 5 days.





Assessment/ Plan: 


Nephrology.





Doing well.


CPS stable without CP or SOB.  +Urine output


No acute events overnight.





Vitals, medications, blood work and imaging reviewed in the chart.


General: Alert, In no apparent distress, Cooperative


HEENT: Atraumatic, Mucous membr. moist/pink


Neck: Supple


Respiratory: Clear to auscultation bilaterally


Cardiovascular: No edema, Regular rate/rhythm, No rubs


Gastrointestinal: Soft and benign, Non-distended


Musculoskeletal: No clubbing, No contractures


Integumentary: No rashes, No cyanosis


Neurological: Normal speech





Serum creatinine: 0.9(Jan 14); 2.73(Jan 16).


Blood work reviewed in the chart.





Imagings Data: 


EXAM DESCRIPTION:  US - Renal Ultrasound-Complete - 1/16/2019 9:25 am


CLINICAL HISTORY:  . Acute renal failure


COMPARISON:  2015


FINDINGS:  The right kidney measures 13 cm with an increased echotexture. A 5.6 

centimeter right renal cyst


The left kidney measures cm with an increased echotexture.


Hydronephrosis is not seen.


IMPRESSION:  A 5.6 centimeter right renal cyst


Increased renal echotexture consistent with parenchymal disease





Conclusions/Impression: 


LEONIDES suspicious for multi-factorial ATN.  Differential includes vancomycin 

toxicity in the setting of recent IV contrast exposure, Pre-Renal Azotemia, ATN.


Persistent Hypernatremia suspicious for possible underlying Diabetes Insipidus.


Hypokalemia.


HTN.


Anemia in chronic illness.


Hypocalcemia.


Mild Rhabdomyolysis.


Hypoalbuminemia.





P/ Continue current POC and Medications.


Gentle IVF.


Replete potassium prn.


Agree with holding vancomycin.


No NSAIDs.


AM labs.  Daily weight.





If the patient is ready for discharge and able to maintain adequate hydration, 

consider discharging back to the SNF with a repeat BMP in 4-7 days.  The ATN 

should continue to improve over the next several days to weeks.

## 2020-01-07 ENCOUNTER — HOSPITAL ENCOUNTER (OUTPATIENT)
Dept: HOSPITAL 97 - ER | Age: 70
Setting detail: OBSERVATION
LOS: 1 days | Discharge: NURSING, REHAB OR LONG-TERM CARE | End: 2020-01-08
Attending: INTERNAL MEDICINE | Admitting: INTERNAL MEDICINE
Payer: COMMERCIAL

## 2020-01-07 VITALS — BODY MASS INDEX: 22.3 KG/M2

## 2020-01-07 DIAGNOSIS — R53.1: ICD-10-CM
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
## 2020-01-07 DIAGNOSIS — K57.90: ICD-10-CM

## 2020-01-07 DIAGNOSIS — I10: ICD-10-CM

## 2020-01-07 DIAGNOSIS — T42.3X5A: ICD-10-CM

## 2020-01-07 DIAGNOSIS — R63.4: ICD-10-CM

## 2020-01-07 DIAGNOSIS — F20.9: ICD-10-CM

## 2020-01-07 DIAGNOSIS — M19.90: ICD-10-CM

## 2020-01-07 DIAGNOSIS — K21.9: ICD-10-CM

## 2020-01-07 DIAGNOSIS — R13.10: ICD-10-CM

## 2020-01-07 DIAGNOSIS — D64.9: ICD-10-CM

## 2020-01-07 DIAGNOSIS — Z85.038: ICD-10-CM

## 2020-01-07 DIAGNOSIS — E86.0: Primary | ICD-10-CM

## 2020-01-07 DIAGNOSIS — G40.909: ICD-10-CM

## 2020-01-07 LAB
ALBUMIN SERPL BCP-MCNC: 2.8 G/DL (ref 3.4–5)
ALP SERPL-CCNC: 83 U/L (ref 45–117)
ALT SERPL W P-5'-P-CCNC: 29 U/L (ref 12–78)
AST SERPL W P-5'-P-CCNC: 35 U/L (ref 15–37)
BUN BLD-MCNC: 30 MG/DL (ref 7–18)
CKMB CREATINE KINASE MB: 1.3 NG/ML (ref 0.3–3.6)
GLUCOSE SERPLBLD-MCNC: 140 MG/DL (ref 74–106)
HCT VFR BLD CALC: 33.8 % (ref 39.6–49)
INR BLD: 1.13
LIPASE SERPL-CCNC: 123 U/L (ref 73–393)
LYMPHOCYTES # SPEC AUTO: 0.7 K/UL (ref 0.7–4.9)
PMV BLD: 8.1 FL (ref 7.6–11.3)
POTASSIUM SERPL-SCNC: 3.5 MMOL/L (ref 3.5–5.1)
RBC # BLD: 3.46 M/UL (ref 4.33–5.43)
TROPONIN (EMERG DEPT USE ONLY): < 0.02 NG/ML (ref 0–0.04)
UA COMPLETE W REFLEX CULTURE PNL UR: (no result)

## 2020-01-07 PROCEDURE — 85730 THROMBOPLASTIN TIME PARTIAL: CPT

## 2020-01-07 PROCEDURE — 84145 PROCALCITONIN (PCT): CPT

## 2020-01-07 PROCEDURE — 82553 CREATINE MB FRACTION: CPT

## 2020-01-07 PROCEDURE — 80185 ASSAY OF PHENYTOIN TOTAL: CPT

## 2020-01-07 PROCEDURE — 92611 MOTION FLUOROSCOPY/SWALLOW: CPT

## 2020-01-07 PROCEDURE — 51702 INSERT TEMP BLADDER CATH: CPT

## 2020-01-07 PROCEDURE — 83690 ASSAY OF LIPASE: CPT

## 2020-01-07 PROCEDURE — 71045 X-RAY EXAM CHEST 1 VIEW: CPT

## 2020-01-07 PROCEDURE — 81003 URINALYSIS AUTO W/O SCOPE: CPT

## 2020-01-07 PROCEDURE — 80184 ASSAY OF PHENOBARBITAL: CPT

## 2020-01-07 PROCEDURE — 80048 BASIC METABOLIC PNL TOTAL CA: CPT

## 2020-01-07 PROCEDURE — 82550 ASSAY OF CK (CPK): CPT

## 2020-01-07 PROCEDURE — 96365 THER/PROPH/DIAG IV INF INIT: CPT

## 2020-01-07 PROCEDURE — 84484 ASSAY OF TROPONIN QUANT: CPT

## 2020-01-07 PROCEDURE — 85025 COMPLETE CBC W/AUTO DIFF WBC: CPT

## 2020-01-07 PROCEDURE — 80076 HEPATIC FUNCTION PANEL: CPT

## 2020-01-07 PROCEDURE — 36415 COLL VENOUS BLD VENIPUNCTURE: CPT

## 2020-01-07 PROCEDURE — 82947 ASSAY GLUCOSE BLOOD QUANT: CPT

## 2020-01-07 PROCEDURE — 74230 X-RAY XM SWLNG FUNCJ C+: CPT

## 2020-01-07 PROCEDURE — 70450 CT HEAD/BRAIN W/O DYE: CPT

## 2020-01-07 PROCEDURE — 87040 BLOOD CULTURE FOR BACTERIA: CPT

## 2020-01-07 PROCEDURE — 83605 ASSAY OF LACTIC ACID: CPT

## 2020-01-07 PROCEDURE — 73521 X-RAY EXAM HIPS BI 2 VIEWS: CPT

## 2020-01-07 PROCEDURE — 99285 EMERGENCY DEPT VISIT HI MDM: CPT

## 2020-01-07 PROCEDURE — 81015 MICROSCOPIC EXAM OF URINE: CPT

## 2020-01-07 PROCEDURE — 96366 THER/PROPH/DIAG IV INF ADDON: CPT

## 2020-01-07 PROCEDURE — 85610 PROTHROMBIN TIME: CPT

## 2020-01-07 RX ADMIN — DEXTROSE AND SODIUM CHLORIDE SCH MLS: 5; .9 INJECTION, SOLUTION INTRAVENOUS at 20:19

## 2020-01-07 NOTE — ER
Nurse's Notes                                                                                     

 [facility]                                                                 

Name: Tai Reyes                                                                                

Age: 69 yrs                                                                                       

Sex: Male                                                                                         

: 1950                                                                                   

MRN: [id number]                                                                                   

Arrival Date: 2020                                                                          

Time: 11:27                                                                                       

Account#: [id number]                                                                            

Bed 8                                                                                             

Private MD:                                                                                       

Diagnosis: Weakness;Altered mental status, unspecified;Adult failure to thrive                    

                                                                                                  

Presentation:                                                                                     

                                                                                             

11:28 Presenting complaint: EMS states: patient is from [facility], he has been    mg2 

      altered for months now, slowly deteriorating but yesterday he was worse like not eating     

      or not responding. BGL- 181, NSR on EKG, bp 104/60, HR-60. Transition of care: patient      

      was received from another setting of care (long-term care facility), [facility]. Onset of symptoms was 2019. Risk Assessment: Do you want to hurt           

      yourself or someone else? Patient reports no desire to harm self or others. Initial         

      Sepsis Screen: Does the patient meet any 2 criteria? No. Patient's initial sepsis           

      screen is negative. Does the patient have a suspected source of infection? No.              

      Patient's initial sepsis screen is negative. Care prior to arrival: None.                   

11:28 Method Of Arrival: EMS: [facility]                                                       mg2 

11:28 Acuity: VALENTIN 2                                                                           mg2 

                                                                                                  

Historical:                                                                                       

- Allergies:                                                                                      

11:34 No Known Allergies;                                                                     mg2 

- Home Meds:                                                                                      

11:34 amlodipine-benazepril 5-20 mg Oral cap 1 cap once daily [Active]; benztropine 0.5 mg    mg2 

      Oral tab 1 tab 2 times per day [Active]; clonazepam 1 mg Oral tab 1 tab nightly             

      [Active]; excitalopram 20mg daily [Active]; Latuda 120 mg Oral tab 1 tab once daily         

      [Active]; lorapine 50mg 2 tab nightly [Active]; metoprolol tartrate 25 mg Oral tab 1        

      tab 2 times per day [Active]; phenobarbital 100 mg Oral tab 1 tab every 8 hours             

      [Active]; Phenytoin 100mg Oral 1 tabs 3 times per day [Active];                             

- PMHx:                                                                                           

11:34 colon cancer; CVA; Depression; Hypertension; Schizophrenia; Seizures;                   mg2 

                                                                                                  

- Immunization history:: Flu vaccine status is unknown.                                           

- Social history:: Smoking status: unknown.                                                       

- Ebola Screening: : No symptoms or risks identified at this time.                                

                                                                                                  

                                                                                                  

Screenin:37 Abuse screen: unable to answer. Nutritional screening: No deficits noted. Tuberculosis  hb  

      screening: No symptoms or risk factors identified. Fall Risk Total [place] Fall Scale         

      indicates High Risk Score (45 or more points). Fall prevention measures have been           

      instituted. Side Rails Up X 2 Frequent Obs/Assessments Occuring As available patient        

      and family educated on Fall Prevention Program and Strategies.                              

                                                                                                  

Assessment:                                                                                       

11:56 General: Appears in no apparent distress. comfortable, Behavior is flat, quiet,         mg2 

      semi-conscious. Pain: Denies pain. Neuro: Level of Consciousness is awake, lethargic.       

      Cardiovascular: Capillary refill < 3 seconds Patient's skin is warm and dry.                

      Respiratory: Airway is patent Respiratory effort is even, unlabored, Respiratory            

      pattern is regular, symmetrical. GI: No signs and/or symptoms were reported involving       

      the gastrointestinal system. : Swelling noted on scrotum. EENT: No signs and/or           

      symptoms were reported regarding the EENT system. Derm: Skin is intact, is healthy with     

      good turgor, Skin is pink, warm \T\ dry. normal. Musculoskeletal: Circulation, motion,      

      and sensation intact. Capillary refill < 3 seconds.                                         

12:39 Reassessment: Patient appears in no apparent distress at this time. No changes from     hb  

      previously documented assessment. Patient and/or family updated on plan of care and         

      expected duration. Pain level reassessed.                                                   

13:26 Reassessment: Patient appears in no apparent distress at this time. Patient and/or      mg2 

      family updated on plan of care and expected duration. Pain level reassessed.                

13:26 Reassessment: sent to ct scan via stretcher.                                            mg2 

13:54 Reassessment: Patient appears in no apparent distress at this time. Patient and/or      hb  

      family updated on plan of care and expected duration. Pain level reassessed.                

14:37 Reassessment: Patient appears in no apparent distress at this time. Patient and/or      mg2 

      family updated on plan of care and expected duration. Pain level reassessed.                

15:30 Reassessment: Patient appears in no apparent distress at this time. No changes from     hb  

      previously documented assessment. Patient and/or family updated on plan of care and         

      expected duration. Pain level reassessed.                                                   

15:43 Reassessment: critical lab result for phenobarbital -59.4 relayed by  and       mg2 

      provider made aware.                                                                        

16:45 Reassessment: Patient appears in no apparent distress at this time. No changes from     hb  

      previously documented assessment. Patient and/or family updated on plan of care and         

      expected duration. Pain level reassessed.                                                   

18:08 Reassessment: Patient appears in no apparent distress at this time. No changes from     hb  

      previously documented assessment. Patient and/or family updated on plan of care and         

      expected duration. Pain level reassessed.                                                   

                                                                                                  

Vital Signs:                                                                                      

11:33 BP 93 / 63; Pulse 65; Resp 18; Temp 96.8(TE); Pulse Ox 98% on R/A; Pain 0/10;           mg2 

11:39 Weight 100 kg; Height 5 ft. 10 in. (177.80 cm);                                         hb  

12:39  / 72; Pulse 70; Resp 19; Pulse Ox 100% on R/A;                                   hb  

13:45  / 62; Pulse 61; Resp 15; Pulse Ox 99% on R/A;                                    hb  

14:35  / 69; Pulse 65; Resp 16; Pulse Ox 100% on R/A;                                   hb  

15:30  / 68; Pulse 58; Resp 15; Pulse Ox 98% on R/A;                                    hb  

16:45  / 71; Pulse 56; Resp 14; Pulse Ox 96% on R/A;                                    hb  

17:28  / 65; Pulse 66; Resp 18; Pulse Ox 100% on R/A;                                   mg2 

18:02  / 74; Pulse 63; Resp 18; Pulse Ox 98% on R/A;                                    mg2 

11:39 Body Mass Index 31.63 (100.00 kg, 177.80 cm)                                            hb  

                                                                                                  

ED Course:                                                                                        

11:27 Patient arrived in ED.                                                                  iw  

11:28 Reece Cross MD is Attending Physician.                                              kdr 

11:31 Triage completed.                                                                       mg2 

11:34 Arm band placed on.                                                                     mg2 

11:37 Patient has correct armband on for positive identification. Bed in low position. Call     

      light in reach. Side rails up X2. Cardiac monitor on. Pulse ox on. NIBP on.                 

11:40 Maintain EMS IV. Dressing intact. Good blood return noted. Site clean \T\ dry. Gauge \T\    hb

      site: 20g LEFT AC.                                                                          

11:45 Initial lab(s) drawn, sent to lab. First set of blood cultures drawn by Arlene MALLOY.    hb  

11:48 Morales cath inserted, using sterile technique, 16 Fr., by me, balloon inflated, to       hb  

      gravity drainage, urine specimen collected. Patient tolerated well.                         

11:57 No provider procedures requiring assistance completed.                                  mg2 

12:09 Dave Almodovar, RN is Primary Nurse.                                                  mg2 

13:24 Chest Single View XRAY In Process Unspecified.                                          EDMS

13:36 CT Head Brain wo Cont In Process Unspecified.                                           EDMS

14:30 Colten Petty MD is Hospitalizing Provider.                                             kdr 

18:36 Patient admitted, IV remains in place.                                                  mg2 

                                                                                                  

Administered Medications:                                                                         

11:46 Drug: NS 0.9% (30 ml/kg) 30 ml/kg Route: IV; Rate: bolus; Site: right antecubital;      hb  

18:06 Follow up: Response: No adverse reaction; IV Status: Completed infusion; IV Intake:     mg2 

      3000ml                                                                                      

12:09 Drug: Rocephin - (cefTRIAXone) 1 grams Route: IVPB; Infused Over: 30 mins; Site: right  [facility] 

      antecubital;                                                                                

15:04 Follow up: Response: No adverse reaction; IV Status: Completed infusion                 mg2 

                                                                                                  

                                                                                                  

Point of Care Testing:                                                                            

      Blood Glucose:                                                                              

11:44 Blood Glucose: 121 mg/dL;                                                               hb  

      Ranges:                                                                                     

                                                                                                  

Intake:                                                                                           

14:35 PO: 0ml; Total: 0ml.                                                                    hb  

18:06 IV: 3000ml; Total: 3000ml.                                                              mg2 

                                                                                                  

Output:                                                                                           

12:10 Urine: 800ml (Morales); Total: 800ml.                                                     mg2 

14:35 Urine: 500ml (Morales); Total: 1300ml.                                                    hb  

                                                                                                  

Outcome:                                                                                          

14:31 Decision to Hospitalize by Provider.                                                    kdr 

18:35 Admitted to Med/surg accompanied by tech, via stretcher, room 216, Report called to  BASILIO rodgers 

      RN                                                                                          

18:35 Condition: stable                                                                           

19:22 Patient left the ED.                                                                    mg2 

                                                                                                  

Signatures:                                                                                       

Dispatcher [employer]                           EDMS                                                 

Reece Cross MD MD   [facility]                                                  

Susan Louie RN RN                                                      

Thuy Michael RN                     RN                                                      

Dave Almodovar, GAMA                    RN   mg2                                                  

                                                                                                  

Corrections: (The following items were deleted from the chart)                                    

11:39 11:39 83.91 kg; Height 5 ft. 10 in.; BMI: 26.5; hb                                      hb  

15:32 15:31 Reassessment: Report called to Veronica MALLOY at [facility] hb                         hb  

                                                                                                  

**************************************************************************************************

## 2020-01-07 NOTE — RAD REPORT
EXAM DESCRIPTION:  RAD - Hip Bilateral With Pelvis - 1/7/2020 8:40 pm

 

CLINICAL HISTORY:  pain, s/p fall

Fall, pain

 

COMPARISON:  Pelvis dated 7/12/2018

 

FINDINGS:  Mild degenerative changes are present in both hips. No acute fracture or dislocation is ev
ident.

## 2020-01-07 NOTE — RAD REPORT
EXAM DESCRIPTION:  CT - Head Brain Wo Cont - 1/7/2020 1:32 pm

 

CLINICAL HISTORY:  Alteration of awareness/confusion

 

COMPARISON:  January 2018

 

TECHNIQUE:  Computed axial tomography of the head was obtained. IV contrast was not requested.

 

All CT scans are performed using dose optimization technique as appropriate and may include automated
 exposure control or mA/KV adjustment according to patient size.

 

FINDINGS:  An intracranial  bleed is not seen .

 

The ventricles are normal in caliber.

 

No extra-axial fluid collection is noted.

 

Old infarctions involving the right frontal lobe and right cerebellum.

 

Fluid within maxillary ethmoid sinuses

 

IMPRESSION:  No acute intracranial abnormality is seen. If patient's symptoms persist  MRI of the bra
in would be recommended.

 

Fluid within the maxillary ethmoid sinuses may indicate acute sinusitis

## 2020-01-07 NOTE — RAD REPORT
EXAM DESCRIPTION:  Darinel Single View1/7/2020 1:18 pm

 

CLINICAL HISTORY:  Alteration of consciousness

 

COMPARISON:  January 9, 2019

 

FINDINGS:  Postsurgical changes involve the right lung

 

 The lungs appear clear of acute infiltrate. The heart is normal size

 

IMPRESSION:   No acute abnormalities displayed

## 2020-01-07 NOTE — EDPHYS
Physician Documentation                                                                           

 [facility]                                                                 

Name: Tia Reyes                                                                                

Age: 69 yrs                                                                                       

Sex: Male                                                                                         

: 1950                                                                                   

MRN: [id number]                                                                                   

Arrival Date: 2020                                                                          

Time: 11:27                                                                                       

Account#: [id number]                                                                            

Bed 8                                                                                             

Private MD:                                                                                       

ED Physician Reece Cross                                                                       

HPI:                                                                                              

                                                                                             

16:59 This 69 yrs old  Male presents to ER via EMS with complaints of Altered Mental kdr 

      Status.                                                                                     

16:59 The patient presents with confusion, decreased mental status, decreased responsiveness. kdr 

      Onset: The symptoms/episode began/occurred today. Possible causes: CVA or TIA, sepsis,      

      Phenobarb toxicity. Associated signs and symptoms: The patient has no apparent              

      associated signs or symptoms. Current symptoms: In the emergency department the             

      patient's symptoms are unchanged from the initial presentation. Patient's baseline:         

      Neuro: not alert, Motor: Left sided weakness due to prior CVA, Ambulation: unable to        

      walk, Speech: slow, slurred, The patient has a previous history of CVA. It is unknown       

      whether or not the patient has had similar symptoms in the past. It is unknown whether      

      or not the patient has recently seen a physician.                                           

                                                                                                  

Historical:                                                                                       

- Allergies:                                                                                      

11:34 No Known Allergies;                                                                     mg2 

- Home Meds:                                                                                      

11:34 amlodipine-benazepril 5-20 mg Oral cap 1 cap once daily [Active]; benztropine 0.5 mg    mg2 

      Oral tab 1 tab 2 times per day [Active]; clonazepam 1 mg Oral tab 1 tab nightly             

      [Active]; excitalopram 20mg daily [Active]; Latuda 120 mg Oral tab 1 tab once daily         

      [Active]; lorapine 50mg 2 tab nightly [Active]; metoprolol tartrate 25 mg Oral tab 1        

      tab 2 times per day [Active]; phenobarbital 100 mg Oral tab 1 tab every 8 hours             

      [Active]; Phenytoin 100mg Oral 1 tabs 3 times per day [Active];                             

- PMHx:                                                                                           

11:34 colon cancer; CVA; Depression; Hypertension; Schizophrenia; Seizures;                   mg2 

                                                                                                  

- Immunization history:: Flu vaccine status is unknown.                                           

- Social history:: Smoking status: unknown.                                                       

- Ebola Screening: : No symptoms or risks identified at this time.                                

                                                                                                  

                                                                                                  

ROS:                                                                                              

16:59 Constitutional: Negative for fever, chills, and weight loss.                            kdr 

16:59 Unable to obtain ROS due to altered mental status.                                          

                                                                                                  

Exam:                                                                                             

16:59 Constitutional:  This is a well developed, well nourished patient who is somnolent but  kdr 

      in no acute distress. Head/Face:  Normocephalic, atraumatic. Eyes:  Pupils equal round      

      and reactive to light, extra-ocular motions intact.  Lids and lashes normal.                

      Conjunctiva and sclera are non-icteric and not injected.  Cornea within normal limits.      

      Periorbital areas with no swelling, redness, or edema. Neck:  Trachea midline, no           

      thyromegaly or masses palpated, and no cervical lymphadenopathy.  Supple, full range of     

      motion without nuchal rigidity, or vertebral point tenderness.  No Meningismus.             

      Chest/axilla:  Normal chest wall appearance and motion.  Nontender with no deformity.       

      No lesions are appreciated. Cardiovascular:  Regular rate and rhythm with a normal S1       

      and S2.  No gallops, murmurs, or rubs.  Normal PMI, no JVD.  No pulse deficits.             

      Respiratory:  Lungs have equal breath sounds bilaterally, clear to auscultation and         

      percussion.  No rales, rhonchi or wheezes noted.  No increased work of breathing, no        

      retractions or nasal flaring. Abdomen/GI:  Soft, non-tender, with normal bowel sounds.      

      No distension or tympany.  No guarding or rebound.  No evidence of tenderness               

      throughout. Back:  No spinal tenderness.  No costovertebral tenderness.  Full range of      

      motion. Skin:  Warm, dry with normal turgor.  Normal color with no rashes, no lesions,      

      and no evidence of cellulitis. MS/ Extremity:  Pulses equal, no cyanosis.                   

      Neurovascular intact.  Full, normal range of motion.                                        

16:59 Neuro: LEFT SIDED WEAKNESS BUT MOVES RIGHT SIDE TO COMMAND.                                 

                                                                                                  

Vital Signs:                                                                                      

11:33 BP 93 / 63; Pulse 65; Resp 18; Temp 96.8(TE); Pulse Ox 98% on R/A; Pain 0/10;           mg2 

11:39 Weight 100 kg; Height 5 ft. 10 in. (177.80 cm);                                         hb  

12:39  / 72; Pulse 70; Resp 19; Pulse Ox 100% on R/A;                                   hb  

13:45  / 62; Pulse 61; Resp 15; Pulse Ox 99% on R/A;                                    hb  

14:35  / 69; Pulse 65; Resp 16; Pulse Ox 100% on R/A;                                   hb  

15:30  / 68; Pulse 58; Resp 15; Pulse Ox 98% on R/A;                                    hb  

16:45  / 71; Pulse 56; Resp 14; Pulse Ox 96% on R/A;                                    hb  

17:28  / 65; Pulse 66; Resp 18; Pulse Ox 100% on R/A;                                   mg2 

18:02  / 74; Pulse 63; Resp 18; Pulse Ox 98% on R/A;                                    mg2 

11:39 Body Mass Index 31.63 (100.00 kg, 177.80 cm)                                            hb  

                                                                                                  

MDM:                                                                                              

14:31 Patient medically screened.                                                             kdr 

16:59 Data reviewed: vital signs, nurses notes, lab test result(s), radiologic studies.       kdr 

      Counseling: I had a detailed discussion with the patient and/or guardian regarding: the     

      historical points, exam findings, and any diagnostic results supporting the                 

      discharge/admit diagnosis, lab results, radiology results, the need for further work-up     

      and treatment in the hospital. Physician consultation: Colten Petty MD regarding             

      admission, and will see patient in inpatient room, later today.                             

                                                                                                  

                                                                                             

11:35 Order name: Basic Metabolic Panel; Complete Time: 12:57                                 mg2 

                                                                                             

11:35 Order name: Blood Culture Adult (2)                                                     mg2 

                                                                                             

11:35 Order name: CBC with Diff; Complete Time: 12:57                                         mg2 

                                                                                             

11:35 Order name: Ckmb; Complete Time: 12:57                                                  mg2 

                                                                                             

11:35 Order name: CPK; Complete Time: 12:57                                                   mg2 

                                                                                             

11:35 Order name: Lactate; Complete Time: 12:57                                               mg2 

                                                                                             

11:35 Order name: LFT's; Complete Time: 12:57                                                 mg2 

                                                                                             

11:35 Order name: Lipase; Complete Time: 12:57                                                mg2 

                                                                                             

11:35 Order name: Procalcitonin; Complete Time: 12:57                                         mg2 

                                                                                             

11:35 Order name: Protime (+inr); Complete Time: 12:57                                        mg2 

                                                                                             

11:35 Order name: Ptt, Activated; Complete Time: 12:57                                        mg2 

                                                                                             

11:35 Order name: Troponin (emerg Dept Use Only); Complete Time: 12:57                        mg2 

                                                                                             

11:35 Order name: Urine Microscopic Only; Complete Time: 14:27                                mg2 

                                                                                             

11:56 Order name: Glucose, Ancillary Testing; Complete Time: 12:57                            EDMS

                                                                                             

11:35 Order name: Chest Single View XRAY; Complete Time: 14:07                                mg2 

                                                                                             

11:35 Order name: Accucheck; Complete Time: 11:54                                             mg2 

                                                                                             

11:35 Order name: Cardiac monitoring; Complete Time: 11:54                                    mg2 

                                                                                             

11:35 Order name: EKG - Nurse/Tech; Complete Time: 11:54                                      mg2 

                                                                                             

11:35 Order name: IV Saline Lock - Large Bore; Complete Time: 11:54                           mg2 

                                                                                             

11:35 Order name: Labs collected and sent; Complete Time: 11:54                               mg2 

                                                                                             

11:35 Order name: O2 Per Protocol; Complete Time: :                                       mg2 

                                                                                             

11:35 Order name: O2 Sat Monitoring; Complete Time: :                                     mg2 

                                                                                             

11:35 Order name: Urine Dipstick-Ancillary (obtain specimen); Complete Time: :            mg2 

                                                                                             

12:17 Order name: Urine Dipstick--Ancillary (enter results); Complete Time: 14:07             bd  

                                                                                             

13:18 Order name: CT Head Brain wo Cont; Complete Time: 14:07                                 kdr 

                                                                                             

14:29 Order name: Dilantin; Complete Time: 15:48                                              kdr 

                                                                                             

14:29 Order name: Phenobarbital; Complete Time: 15:48                                         kdr 

                                                                                                  

Administered Medications:                                                                         

11:46 Drug: NS 0.9% (30 ml/kg) 30 ml/kg Route: IV; Rate: bolus; Site: right antecubital;      hb  

18:06 Follow up: Response: No adverse reaction; IV Status: Completed infusion; IV Intake:     mg2 

      3000ml                                                                                      

12:09 Drug: Rocephin - (cefTRIAXone) 1 grams Route: IVPB; Infused Over: 30 mins; Site: right  mg2 

      antecubital;                                                                                

15:04 Follow up: Response: No adverse reaction; IV Status: Completed infusion                 mg2 

                                                                                                  

                                                                                                  

Point of Care Testing:                                                                            

      Blood Glucose:                                                                              

11:44 Blood Glucose: 121 mg/dL;                                                               hb  

      Ranges:                                                                                     

      Critical Glucose Levels:Adult <50 mg/dl or >400 mg/dl  <40 mg/dl or >180 mg/dl       

Disposition:                                                                                      

20 14:31 Hospitalization ordered by Colten Petty for Inpatient Admission. Preliminary       

  diagnosis are Weakness, Altered mental status, unspecified, Adult failure to                    

  thrive.                                                                                         

- Bed requested for Telemetry/MedSurg (Inpatient).                                                

- Status is Inpatient Admission.                                                              mg2 

- Condition is Fair.                                                                              

- Problem is new.                                                                                 

- Symptoms are unchanged.                                                                         

UTI on Admission? No                                                                              

                                                                                                  

                                                                                                  

                                                                                                  

Signatures:                                                                                       

Dispatcher [employer]                           EDMilena Vargas RN                        RN                                                      

Reece Cross MD MD   [facility]                                                  

Thuy Michael RN                     RN                                                      

Dave Almodovar RN RN   mg2                                                  

                                                                                                  

Corrections: (The following items were deleted from the chart)                                    

18:06 14:31 Hospitalization Ordered by Colten Petty MD for Inpatient Admission. Preliminary    dw  

      diagnosis is Weakness; Altered mental status, unspecified; Adult failure to thrive. Bed     

      requested for Telemetry/MedSurg (Inpatient). Status is Inpatient Admission. Condition       

      is Fair. Problem is new. Symptoms are unchanged. UTI on Admission? No. kdr                  

19:22 18:06 2020 14:31 Hospitalization Ordered by Colten Petty MD for Inpatient          mg2 

      Admission. Preliminary diagnosis is Weakness; Altered mental status, unspecified; Adult     

      failure to thrive. Bed requested for Telemetry/MedSurg (Inpatient). Status is Inpatient     

      Admission. Condition is Fair. Problem is new. Symptoms are unchanged. UTI on Admission?     

      No. dw                                                                                      

                                                                                                  

**************************************************************************************************

## 2020-01-08 VITALS — SYSTOLIC BLOOD PRESSURE: 148 MMHG | TEMPERATURE: 97.9 F | DIASTOLIC BLOOD PRESSURE: 75 MMHG

## 2020-01-08 VITALS — OXYGEN SATURATION: 98 %

## 2020-01-08 RX ADMIN — DEXTROSE AND SODIUM CHLORIDE SCH MLS: 5; .9 INJECTION, SOLUTION INTRAVENOUS at 03:23

## 2020-01-08 NOTE — HP
Date of Admission:  01/07/2020



Chief Complaint:  Trouble swallowing, feeling weak.



History Of Present Illness:  A 69-year-old male patient living at nursing home, who is losing weight 
over a period of time and has reduced appetite.  He has had some falls at nursing home as well from t
[place] to time.  Today, he was having trouble swallowing at the nursing home.  This actually started yes
terday and today nurse contacted me and she was advised to send the patient to the emergency room.  A
fter the patient was evaluated in the ER, he was admitted to the hospital.  I saw him in the hospital
 this evening.  His family members were with him.  He appears very weak, but still able to recognize 
me, answers questions appropriately.  He is complaining of pain in his both groin and upper thigh are
a.  Denies any other specific complaints.



Review of Systems:

Musculoskeletal:  As mentioned above. 

GI:  As mentioned above. 

Constitutional:  As mentioned above. 



All other systems reviewed and negative.



Allergies:  NO KNOWN ALLERGIES.



Medications:  List reviewed.



Social History:  Negative for smoking or alcohol use.



Family History:  Significant for hypertension, diabetes, coronary artery disease.



Past Surgical History:  Right-sided hemicolectomy in February 2015 for colon cancer, hydrocele repair
, appendectomy, skin cancer removal, partial lobectomy from right lung in 1985, and it was not due to
 cancer.  Cardiac cath done September 2015, showed normal coronaries.



Past Medical History:  Significant for seizure disorder, gastroesophageal reflux disease, hypertensio
n, hyperlipidemia, colon cancer, impaired fasting glucose, diverticulosis, bladder hypertonicity schi
zophrenia, allergic rhinitis, osteoarthritis at multiple sites.



Physical Examination:

Vital Signs:  When he came into emergency room, blood pressure 93/63, pulse 65, respiratory rate 18, 
temperature 96.8, oxygen saturation 98%, height 5 feet 10 inches, weight was recorded as 100 kg in th
e emergency room, which is not accurate as I remember his weight at the nursing home was around 175-1
76 pounds according to my recollection, so current hospital weight is not accurate. 

General:  The patient appears very weak, tired looking, not in any distress. 

HEENT:  Head atraumatic, normocephalic.  Conjunctivae nonerythematous.  Sclerae white.  Mouth, no thr
ush or edema noted.  Ears/Nose, no mass, lesion, discharge noted. 

Neck:  Supple. No JVD, lymph nodes, bruit, thyromegaly noted. 

Lungs:  Bilateral good equal air entry. Clear to auscultation. No rhonchi.  No rales. 

Heart:  Normal heart sounds, no murmur or gallop. 

Abdomen:  Soft, bowel sounds normal. No guarding, rigidity, tenderness, mass, hepatosplenomegaly, dis
tention, or bruit noted. 

Extremities:  No leg edema.  No calf tenderness. 

Skin:  Has erythematous skin on his buttocks consistent with stage I decubitus area.  No open wound. 


Lymphatics:  No lymph node enlargement in neck, supraclavicular, infraclavicular region. 

Neuro:  No focal neurological deficit. 

Chest:  Unremarkable. 

External Genitalia:  Deferred. 

Rectal:  Deferred. 

CNS:  Spastic deformity of left upper extremity, which is chronic.



Laboratory Data:  White count 6.4, hemoglobin 11.3, platelets 164.  Sodium 144, potassium 3.5, chlori
de 108, bicarb 32, BUN 30, creatinine 1.01, glucose 140.  Liver function tests unremarkable.  Troponi
n less than 0.02.  Procalcitonin less than 0.05.  Lactic acid level 1.2.  Urinalysis negative.  Pheno
barbital level 59.4, normal range is 15-40.  Dilantin level 15.7.  Chest x-ray, no acute cardiopulmon
angela changes.  CAT scan of the head, no acute intracranial changes.



Impression:  

1.Volume depletion.

2.Dehydration.

3.Anemia.

4.Phenobarbital toxicity.

5.Weight loss.

6.Dysphagia.

7.Colon cancer.

8.Seizure disorder.

9.Schizophrenia.

10.Gastroesophageal reflux disease.

11.Hyperlipidemia.

12.Hypertension.

13.Impaired fasting glucose.

14.Diverticulosis.

15.Osteoarthritis on multiple sites.



Plan:  We will admit patient to hospital for further evaluation and management of this problem.  Jacqueline
ent is appropriate for inpatient and is expected to spend 2 midnights in the hospital.  We will go ah
ead and keep him n.p.o. tomorrow morning.  We will have speech therapist evaluate him.  Get modified 
barium swallow test done.  We will give him IV fluid.  Fall precaution was ordered.  Air mattress was
 ordered.  We will go ahead and apply foam dressing to his buttock area for skin protection.  We will
 get a bilateral hip and pelvis x-ray done.  MRI of brain done tomorrow morning.  Repeat Dilantin and
 phenobarbital level in the morning.  There is no evidence of any infection anywhere.  I did talk to 
patient's family member regarding his overall poor prognosis, his nephew, who has medical power of at
[facility].  I talked to him at the hospital today and we discussed also about advance directives.  Jacquelineomari
nt is not able to make any decision on his own at this point as he is not completely oriented and christine
hew made the decision for him on the basis of his overall health and poor prognosis.  In the event of
 cardiopulmonary arrest, he informed me that the patient would not have wanted any CPR, defibrillatio
n or ventilator support and DNR order was written in the chart.  We also talked about possibility of 
feeding tube placement if that becomes necessary after swallowing evaluation and he will think about 
it and let me know if he need to make the decision at some point.  If we do not find any definite maria de jesus
son for his declining health, then hospice 

care may become necessary in the near future and we discussed about that as well.  I will see him kayli
orrow morning for followup.





ZAIRE/MODL

DD:  01/07/2020 20:15:02Voice ID:  [id number]

## 2020-01-08 NOTE — RAD REPORT
EXAM DESCRIPTION:  RAD - Barium Swallow Modified - 1/8/2020 11:22 am

 

CLINICAL HISTORY:  swallowing evaluation

Dysphagia

 

COMPARISON:  Renal Ultrasound-Complete dated 1/16/2019

 

TECHNIQUE:  The patient was given liquid, semi-solid and solid forms of barium. Lateral view fluorosc
opic imaging was performed in conjunction with speech pathology service.

 

FINDINGS:  laryngeal pentration : cleared with thin by straw on 3rd consecutive sip

pharyngeal residue: vallecular mild with all

other : study limited by pts behavior, cognition, and poor posture , 1 sec swallow delay , mild esoph
ageal stasis in upper esophagus

 

 

Total fluoroscopy time: 3 minutes and 25 seconds

## 2020-01-09 NOTE — DS
Date of Discharge:  01/08/2020



Disposition:  Discharged to go back to nursing home.



Physical Examination:

HEENT:  Unremarkable. 

Lungs:  Clear to auscultation. 

Heart:  Heart sounds normal. 

Abdomen:  Soft, bowel sounds normal.  No guarding, rigidity, tenderness, or distention. 

Extremities:  No leg edema.



Discharge Medications And Instructions:  

1.Continue all prior home medication except dose of phenobarbital from 1 tablet 3 times a day to 1 t
ablet 2 times a day starting tomorrow.  Diet will be pureed diet with thin liquids using straw and I 
have called personally Nursing Home and given this discharge instruction about medication change and 
diet instruction to the nurse.

2.Patient to be admitted to hospice care with diagnosis of colon cancer as a hospice diagnosis once 
family makes a decision to start hospice services.



Hospital Course:  A 69-year-old male patient, who was brought into emergency room with complaints of 
trouble swallowing, feeling weak.  Please see dictated H and P for more information.  After patient w
as evaluated in the emergency room, he was admitted to the hospital.  His CAT scan of the head was ne
gative for any acute changes.  Blood work was unremarkable except phenobarbital level was slightly hi
gher than normal.  He was admitted to the hospital.  Overnight his condition remained stable.  This m
orning, phenobarbital level started to come down, it was still higher than upper normal of therapeuti
c range, so we have not given him any phenobarbital today.  We should be able to restart it at a lowe
r dose.  His x-ray of the bilateral hip and pelvis was negative for any acute changes.  No fracture. 
 CAT scan of the head was negative for any evidence of stroke or metastatic disease.  I wanted to do 
MRI of brain and the patient was not cooperative.  He was agitated, restless, so Ativan 0.5 mg IV x1 
dose was given and that did not help at all.  So we had to give him more and more medication, but the
n one has to worry about respiratory suppression and at this point considering the risk and benefit, 
we decided not to pursue further MRI and I did call the patient's next of kin, that is patient's neph
ew, and talked to him in detail on the phone this morning and explained to him about all these detail
s.  We also talked yesterday evening with the family and this morning when I called him, I explained 
him about my recommendation of the patient to go on hospice care considering his declining health at 
the nursing home over last few months and he understands and he will communicate with the nursing sta
ff at the nursing home soon as he makes his decision regarding hospice care.  The patient's advance d
irective while in the hospital was do not resuscitate as per my discussion with family.  The patient 
was discharged in stable condition back to nursing home.  We did obtain modified barium swallow and d
iet recommendation was obtained as per Speech Therapy.



Final Diagnoses:  

1.Volume depletion.

2.Dehydration.

3.Anemia.

4.Phenobarbital toxicity.

5.Weight loss.

6.Dysphagia.

7.Colon cancer.

8.Seizure disorder.

9.Schizophrenia.

10.Gastroesophageal reflux disease.

11.Hypertension.

12.Impaired fasting glucose.

13.Diverticulosis.

14.Osteoarthritis, multiple sites.





ZAIRE/MODL

DD:  01/08/2020 19:44:14Voice ID:  [id number]

DT:  01/09/2020 04:03:15Report ID:  [id number]